# Patient Record
Sex: MALE | URBAN - METROPOLITAN AREA
[De-identification: names, ages, dates, MRNs, and addresses within clinical notes are randomized per-mention and may not be internally consistent; named-entity substitution may affect disease eponyms.]

---

## 2020-01-01 ENCOUNTER — INPATIENT (INPATIENT)
Facility: HOSPITAL | Age: 40
LOS: 0 days | End: 2020-06-18
Attending: NEUROLOGICAL SURGERY | Admitting: NEUROLOGICAL SURGERY
Payer: SELF-PAY

## 2020-01-01 VITALS
SYSTOLIC BLOOD PRESSURE: 138 MMHG | OXYGEN SATURATION: 98 % | TEMPERATURE: 97 F | DIASTOLIC BLOOD PRESSURE: 74 MMHG | RESPIRATION RATE: 20 BRPM | WEIGHT: 154.32 LBS | HEART RATE: 72 BPM

## 2020-01-01 VITALS — RESPIRATION RATE: 20 BRPM | HEART RATE: 88 BPM

## 2020-01-01 DIAGNOSIS — S06.5X9A TRAUMATIC SUBDURAL HEMORRHAGE WITH LOSS OF CONSCIOUSNESS OF UNSPECIFIED DURATION, INITIAL ENCOUNTER: ICD-10-CM

## 2020-01-01 DIAGNOSIS — Z98.890 OTHER SPECIFIED POSTPROCEDURAL STATES: ICD-10-CM

## 2020-01-01 LAB
ALBUMIN SERPL ELPH-MCNC: 3.1 G/DL — LOW (ref 3.3–5)
ALBUMIN SERPL ELPH-MCNC: 3.2 G/DL — LOW (ref 3.3–5)
ALBUMIN SERPL ELPH-MCNC: 3.3 G/DL — SIGNIFICANT CHANGE UP (ref 3.3–5)
ALP SERPL-CCNC: 57 U/L — SIGNIFICANT CHANGE UP (ref 40–120)
ALP SERPL-CCNC: 64 U/L — SIGNIFICANT CHANGE UP (ref 40–120)
ALP SERPL-CCNC: 87 U/L — SIGNIFICANT CHANGE UP (ref 40–120)
ALT FLD-CCNC: 49 U/L — HIGH (ref 4–41)
ALT FLD-CCNC: 55 U/L — HIGH (ref 4–41)
ALT FLD-CCNC: 84 U/L — HIGH (ref 4–41)
AMPHET UR-MCNC: NEGATIVE — SIGNIFICANT CHANGE UP
ANION GAP SERPL CALC-SCNC: 18 MMO/L — HIGH (ref 7–14)
ANION GAP SERPL CALC-SCNC: 19 MMO/L — HIGH (ref 7–14)
ANION GAP SERPL CALC-SCNC: 19 MMO/L — HIGH (ref 7–14)
ANION GAP SERPL CALC-SCNC: 21 MMO/L — HIGH (ref 7–14)
APAP SERPL-MCNC: < 15 UG/ML — LOW (ref 15–25)
APPEARANCE UR: SIGNIFICANT CHANGE UP
APTT BLD: 22.5 SEC — LOW (ref 27.5–36.3)
APTT BLD: 23.4 SEC — LOW (ref 27.5–36.3)
AST SERPL-CCNC: 117 U/L — HIGH (ref 4–40)
AST SERPL-CCNC: 126 U/L — HIGH (ref 4–40)
AST SERPL-CCNC: 202 U/L — HIGH (ref 4–40)
BACTERIA # UR AUTO: HIGH
BARBITURATES UR SCN-MCNC: NEGATIVE — SIGNIFICANT CHANGE UP
BASE EXCESS BLDA CALC-SCNC: -18.1 MMOL/L — SIGNIFICANT CHANGE UP
BASE EXCESS BLDA CALC-SCNC: -20.2 MMOL/L — SIGNIFICANT CHANGE UP
BASE EXCESS BLDA CALC-SCNC: 2.8 MMOL/L — SIGNIFICANT CHANGE UP
BASE EXCESS BLDA CALC-SCNC: 3 MMOL/L — SIGNIFICANT CHANGE UP
BASE EXCESS BLDA CALC-SCNC: 3.1 MMOL/L — SIGNIFICANT CHANGE UP
BASE EXCESS BLDV CALC-SCNC: 1.8 MMOL/L — SIGNIFICANT CHANGE UP
BASOPHILS # BLD AUTO: 0.01 K/UL — SIGNIFICANT CHANGE UP (ref 0–0.2)
BASOPHILS # BLD AUTO: 0.01 K/UL — SIGNIFICANT CHANGE UP (ref 0–0.2)
BASOPHILS # BLD AUTO: 0.02 K/UL — SIGNIFICANT CHANGE UP (ref 0–0.2)
BASOPHILS NFR BLD AUTO: 0.1 % — SIGNIFICANT CHANGE UP (ref 0–2)
BASOPHILS NFR BLD AUTO: 0.1 % — SIGNIFICANT CHANGE UP (ref 0–2)
BASOPHILS NFR BLD AUTO: 0.4 % — SIGNIFICANT CHANGE UP (ref 0–2)
BENZODIAZ UR-MCNC: NEGATIVE — SIGNIFICANT CHANGE UP
BILIRUB SERPL-MCNC: 0.8 MG/DL — SIGNIFICANT CHANGE UP (ref 0.2–1.2)
BILIRUB SERPL-MCNC: 1.1 MG/DL — SIGNIFICANT CHANGE UP (ref 0.2–1.2)
BILIRUB SERPL-MCNC: 2 MG/DL — HIGH (ref 0.2–1.2)
BILIRUB UR-MCNC: NEGATIVE — SIGNIFICANT CHANGE UP
BLD GP AB SCN SERPL QL: NEGATIVE — SIGNIFICANT CHANGE UP
BLOOD GAS ARTERIAL - FIO2: 21 — SIGNIFICANT CHANGE UP
BLOOD GAS ARTERIAL - FIO2: 30 — SIGNIFICANT CHANGE UP
BLOOD GAS ARTERIAL - FIO2: 50 — SIGNIFICANT CHANGE UP
BLOOD GAS ARTERIAL - FIO2: 60 — SIGNIFICANT CHANGE UP
BLOOD GAS ARTERIAL - FIO2: 80 — SIGNIFICANT CHANGE UP
BLOOD GAS VENOUS - CREATININE: 0.7 MG/DL — SIGNIFICANT CHANGE UP (ref 0.5–1.3)
BLOOD GAS VENOUS - FIO2: 21 — SIGNIFICANT CHANGE UP
BLOOD UR QL VISUAL: SIGNIFICANT CHANGE UP
BUN SERPL-MCNC: 10 MG/DL — SIGNIFICANT CHANGE UP (ref 7–23)
BUN SERPL-MCNC: 11 MG/DL — SIGNIFICANT CHANGE UP (ref 7–23)
BUN SERPL-MCNC: 11 MG/DL — SIGNIFICANT CHANGE UP (ref 7–23)
BUN SERPL-MCNC: 13 MG/DL — SIGNIFICANT CHANGE UP (ref 7–23)
CA-I BLDA-SCNC: 0.94 MMOL/L — LOW (ref 1.15–1.29)
CA-I BLDA-SCNC: 0.98 MMOL/L — LOW (ref 1.15–1.29)
CA-I BLDA-SCNC: 1.05 MMOL/L — LOW (ref 1.15–1.29)
CALCIUM SERPL-MCNC: 7.5 MG/DL — LOW (ref 8.4–10.5)
CALCIUM SERPL-MCNC: 7.9 MG/DL — LOW (ref 8.4–10.5)
CALCIUM SERPL-MCNC: 8.2 MG/DL — LOW (ref 8.4–10.5)
CALCIUM SERPL-MCNC: 8.3 MG/DL — LOW (ref 8.4–10.5)
CANNABINOIDS UR-MCNC: NEGATIVE — SIGNIFICANT CHANGE UP
CHLORIDE BLDA-SCNC: 103 MMOL/L — SIGNIFICANT CHANGE UP (ref 96–108)
CHLORIDE BLDA-SCNC: 104 MMOL/L — SIGNIFICANT CHANGE UP (ref 96–108)
CHLORIDE BLDV-SCNC: 97 MMOL/L — SIGNIFICANT CHANGE UP (ref 96–108)
CHLORIDE SERPL-SCNC: 100 MMOL/L — SIGNIFICANT CHANGE UP (ref 98–107)
CHLORIDE SERPL-SCNC: 101 MMOL/L — SIGNIFICANT CHANGE UP (ref 98–107)
CHLORIDE SERPL-SCNC: 103 MMOL/L — SIGNIFICANT CHANGE UP (ref 98–107)
CHLORIDE SERPL-SCNC: 92 MMOL/L — LOW (ref 98–107)
CHLORIDE UR-SCNC: 82 MMOL/L — SIGNIFICANT CHANGE UP
CO2 SERPL-SCNC: 23 MMOL/L — SIGNIFICANT CHANGE UP (ref 22–31)
CO2 SERPL-SCNC: 23 MMOL/L — SIGNIFICANT CHANGE UP (ref 22–31)
CO2 SERPL-SCNC: 24 MMOL/L — SIGNIFICANT CHANGE UP (ref 22–31)
CO2 SERPL-SCNC: 25 MMOL/L — SIGNIFICANT CHANGE UP (ref 22–31)
COCAINE METAB.OTHER UR-MCNC: NEGATIVE — SIGNIFICANT CHANGE UP
COLOR SPEC: YELLOW — SIGNIFICANT CHANGE UP
CREAT ?TM UR-MCNC: 63.2 MG/DL — SIGNIFICANT CHANGE UP
CREAT SERPL-MCNC: 0.72 MG/DL — SIGNIFICANT CHANGE UP (ref 0.5–1.3)
CREAT SERPL-MCNC: 0.73 MG/DL — SIGNIFICANT CHANGE UP (ref 0.5–1.3)
CREAT SERPL-MCNC: 0.74 MG/DL — SIGNIFICANT CHANGE UP (ref 0.5–1.3)
CREAT SERPL-MCNC: 0.76 MG/DL — SIGNIFICANT CHANGE UP (ref 0.5–1.3)
EOSINOPHIL # BLD AUTO: 0 K/UL — SIGNIFICANT CHANGE UP (ref 0–0.5)
EOSINOPHIL NFR BLD AUTO: 0 % — SIGNIFICANT CHANGE UP (ref 0–6)
EPI CELLS # UR: SIGNIFICANT CHANGE UP
ETHANOL BLD-MCNC: 341 MG/DL — HIGH
GAS PNL BLDV: 136 MMOL/L — SIGNIFICANT CHANGE UP (ref 136–146)
GLUCOSE BLDA-MCNC: 141 MG/DL — HIGH (ref 70–99)
GLUCOSE BLDA-MCNC: 152 MG/DL — HIGH (ref 70–99)
GLUCOSE BLDA-MCNC: 167 MG/DL — HIGH (ref 70–99)
GLUCOSE BLDA-MCNC: 35 MG/DL — CRITICAL LOW (ref 70–99)
GLUCOSE BLDA-MCNC: 43 MG/DL — CRITICAL LOW (ref 70–99)
GLUCOSE BLDV-MCNC: 147 MG/DL — HIGH (ref 70–99)
GLUCOSE SERPL-MCNC: 148 MG/DL — HIGH (ref 70–99)
GLUCOSE SERPL-MCNC: 148 MG/DL — HIGH (ref 70–99)
GLUCOSE SERPL-MCNC: 150 MG/DL — HIGH (ref 70–99)
GLUCOSE SERPL-MCNC: 169 MG/DL — HIGH (ref 70–99)
GLUCOSE UR-MCNC: 50 — SIGNIFICANT CHANGE UP
HCO3 BLDA-SCNC: 10 MMOL/L — CRITICAL LOW (ref 22–26)
HCO3 BLDA-SCNC: 27 MMOL/L — HIGH (ref 22–26)
HCO3 BLDA-SCNC: 27 MMOL/L — HIGH (ref 22–26)
HCO3 BLDA-SCNC: 28 MMOL/L — HIGH (ref 22–26)
HCO3 BLDA-SCNC: 9 MMOL/L — CRITICAL LOW (ref 22–26)
HCO3 BLDV-SCNC: 26 MMOL/L — SIGNIFICANT CHANGE UP (ref 20–27)
HCT VFR BLD CALC: 26.8 % — LOW (ref 39–50)
HCT VFR BLD CALC: 28.8 % — LOW (ref 39–50)
HCT VFR BLD CALC: 29.8 % — LOW (ref 39–50)
HCT VFR BLD CALC: 31.5 % — LOW (ref 39–50)
HCT VFR BLDA CALC: 25.4 % — LOW (ref 39–51)
HCT VFR BLDA CALC: 29.3 % — LOW (ref 39–51)
HCT VFR BLDA CALC: 31.2 % — LOW (ref 39–51)
HCT VFR BLDA CALC: 32 % — LOW (ref 39–51)
HCT VFR BLDA CALC: 33 % — LOW (ref 39–51)
HCT VFR BLDV CALC: 34 % — LOW (ref 39–51)
HGB BLD-MCNC: 10.1 G/DL — LOW (ref 13–17)
HGB BLD-MCNC: 10.6 G/DL — LOW (ref 13–17)
HGB BLD-MCNC: 9.3 G/DL — LOW (ref 13–17)
HGB BLD-MCNC: 9.9 G/DL — LOW (ref 13–17)
HGB BLDA-MCNC: 10.1 G/DL — LOW (ref 13–17)
HGB BLDA-MCNC: 10.4 G/DL — LOW (ref 13–17)
HGB BLDA-MCNC: 10.7 G/DL — LOW (ref 13–17)
HGB BLDA-MCNC: 8.2 G/DL — LOW (ref 13–17)
HGB BLDA-MCNC: 9.5 G/DL — LOW (ref 13–17)
HGB BLDV-MCNC: 11 G/DL — LOW (ref 13–17)
IMM GRANULOCYTES NFR BLD AUTO: 0.2 % — SIGNIFICANT CHANGE UP (ref 0–1.5)
IMM GRANULOCYTES NFR BLD AUTO: 0.6 % — SIGNIFICANT CHANGE UP (ref 0–1.5)
IMM GRANULOCYTES NFR BLD AUTO: 0.6 % — SIGNIFICANT CHANGE UP (ref 0–1.5)
INR BLD: 0.92 — SIGNIFICANT CHANGE UP (ref 0.88–1.17)
INR BLD: 0.97 — SIGNIFICANT CHANGE UP (ref 0.88–1.17)
KETONES UR-MCNC: 20 — SIGNIFICANT CHANGE UP
LACTATE BLDA-SCNC: 10.5 MMOL/L — CRITICAL HIGH (ref 0.5–2)
LACTATE BLDA-SCNC: 10.7 MMOL/L — CRITICAL HIGH (ref 0.5–2)
LACTATE BLDA-SCNC: 2.4 MMOL/L — HIGH (ref 0.5–2)
LACTATE BLDA-SCNC: 2.6 MMOL/L — HIGH (ref 0.5–2)
LACTATE BLDA-SCNC: 3.8 MMOL/L — HIGH (ref 0.5–2)
LACTATE BLDV-MCNC: 4.2 MMOL/L — CRITICAL HIGH (ref 0.5–2)
LEUKOCYTE ESTERASE UR-ACNC: NEGATIVE — SIGNIFICANT CHANGE UP
LYMPHOCYTES # BLD AUTO: 0.15 K/UL — LOW (ref 1–3.3)
LYMPHOCYTES # BLD AUTO: 0.28 K/UL — LOW (ref 1–3.3)
LYMPHOCYTES # BLD AUTO: 0.3 K/UL — LOW (ref 1–3.3)
LYMPHOCYTES # BLD AUTO: 1.7 % — LOW (ref 13–44)
LYMPHOCYTES # BLD AUTO: 3.9 % — LOW (ref 13–44)
LYMPHOCYTES # BLD AUTO: 5.6 % — LOW (ref 13–44)
MAGNESIUM SERPL-MCNC: 2.2 MG/DL — SIGNIFICANT CHANGE UP (ref 1.6–2.6)
MCHC RBC-ENTMCNC: 27.6 PG — SIGNIFICANT CHANGE UP (ref 27–34)
MCHC RBC-ENTMCNC: 27.7 PG — SIGNIFICANT CHANGE UP (ref 27–34)
MCHC RBC-ENTMCNC: 28 PG — SIGNIFICANT CHANGE UP (ref 27–34)
MCHC RBC-ENTMCNC: 28.9 PG — SIGNIFICANT CHANGE UP (ref 27–34)
MCHC RBC-ENTMCNC: 33.7 % — SIGNIFICANT CHANGE UP (ref 32–36)
MCHC RBC-ENTMCNC: 33.9 % — SIGNIFICANT CHANGE UP (ref 32–36)
MCHC RBC-ENTMCNC: 34.4 % — SIGNIFICANT CHANGE UP (ref 32–36)
MCHC RBC-ENTMCNC: 34.7 % — SIGNIFICANT CHANGE UP (ref 32–36)
MCV RBC AUTO: 81.6 FL — SIGNIFICANT CHANGE UP (ref 80–100)
MCV RBC AUTO: 81.6 FL — SIGNIFICANT CHANGE UP (ref 80–100)
MCV RBC AUTO: 82 FL — SIGNIFICANT CHANGE UP (ref 80–100)
MCV RBC AUTO: 83.2 FL — SIGNIFICANT CHANGE UP (ref 80–100)
METHADONE UR-MCNC: NEGATIVE — SIGNIFICANT CHANGE UP
MONOCYTES # BLD AUTO: 0.17 K/UL — SIGNIFICANT CHANGE UP (ref 0–0.9)
MONOCYTES # BLD AUTO: 0.32 K/UL — SIGNIFICANT CHANGE UP (ref 0–0.9)
MONOCYTES # BLD AUTO: 0.42 K/UL — SIGNIFICANT CHANGE UP (ref 0–0.9)
MONOCYTES NFR BLD AUTO: 1.9 % — LOW (ref 2–14)
MONOCYTES NFR BLD AUTO: 4.5 % — SIGNIFICANT CHANGE UP (ref 2–14)
MONOCYTES NFR BLD AUTO: 7.8 % — SIGNIFICANT CHANGE UP (ref 2–14)
NEUTROPHILS # BLD AUTO: 4.61 K/UL — SIGNIFICANT CHANGE UP (ref 1.8–7.4)
NEUTROPHILS # BLD AUTO: 6.54 K/UL — SIGNIFICANT CHANGE UP (ref 1.8–7.4)
NEUTROPHILS # BLD AUTO: 8.55 K/UL — HIGH (ref 1.8–7.4)
NEUTROPHILS NFR BLD AUTO: 85.6 % — HIGH (ref 43–77)
NEUTROPHILS NFR BLD AUTO: 90.9 % — HIGH (ref 43–77)
NEUTROPHILS NFR BLD AUTO: 96.1 % — HIGH (ref 43–77)
NITRITE UR-MCNC: NEGATIVE — SIGNIFICANT CHANGE UP
NRBC # FLD: 0 K/UL — SIGNIFICANT CHANGE UP (ref 0–0)
OPIATES UR-MCNC: NEGATIVE — SIGNIFICANT CHANGE UP
OSMOLALITY SERPL: 318 MOSMO/KG — HIGH (ref 275–295)
OSMOLALITY UR: 558 MOSMO/KG — SIGNIFICANT CHANGE UP (ref 50–1200)
OXYCODONE UR-MCNC: NEGATIVE — SIGNIFICANT CHANGE UP
PCO2 BLDA: 35 MMHG — SIGNIFICANT CHANGE UP (ref 35–48)
PCO2 BLDA: 35 MMHG — SIGNIFICANT CHANGE UP (ref 35–48)
PCO2 BLDA: 38 MMHG — SIGNIFICANT CHANGE UP (ref 35–48)
PCO2 BLDA: 39 MMHG — SIGNIFICANT CHANGE UP (ref 35–48)
PCO2 BLDA: 40 MMHG — SIGNIFICANT CHANGE UP (ref 35–48)
PCO2 BLDV: 44 MMHG — SIGNIFICANT CHANGE UP (ref 41–51)
PCP UR-MCNC: NEGATIVE — SIGNIFICANT CHANGE UP
PH BLDA: 6.99 PH — CRITICAL LOW (ref 7.35–7.45)
PH BLDA: 7.05 PH — CRITICAL LOW (ref 7.35–7.45)
PH BLDA: 7.45 PH — SIGNIFICANT CHANGE UP (ref 7.35–7.45)
PH BLDA: 7.49 PH — HIGH (ref 7.35–7.45)
PH BLDA: 7.49 PH — HIGH (ref 7.35–7.45)
PH BLDV: 7.4 PH — SIGNIFICANT CHANGE UP (ref 7.32–7.43)
PH UR: 6 — SIGNIFICANT CHANGE UP (ref 5–8)
PHOSPHATE SERPL-MCNC: 3.4 MG/DL — SIGNIFICANT CHANGE UP (ref 2.5–4.5)
PLATELET # BLD AUTO: 126 K/UL — LOW (ref 150–400)
PLATELET # BLD AUTO: 126 K/UL — LOW (ref 150–400)
PLATELET # BLD AUTO: 132 K/UL — LOW (ref 150–400)
PLATELET # BLD AUTO: 134 K/UL — LOW (ref 150–400)
PMV BLD: 9.1 FL — SIGNIFICANT CHANGE UP (ref 7–13)
PMV BLD: 9.3 FL — SIGNIFICANT CHANGE UP (ref 7–13)
PMV BLD: 9.4 FL — SIGNIFICANT CHANGE UP (ref 7–13)
PMV BLD: 9.6 FL — SIGNIFICANT CHANGE UP (ref 7–13)
PO2 BLDA: 112 MMHG — HIGH (ref 83–108)
PO2 BLDA: 136 MMHG — HIGH (ref 83–108)
PO2 BLDA: 140 MMHG — HIGH (ref 83–108)
PO2 BLDA: 237 MMHG — HIGH (ref 83–108)
PO2 BLDA: 90 MMHG — SIGNIFICANT CHANGE UP (ref 83–108)
PO2 BLDV: 100 MMHG — HIGH (ref 35–40)
POTASSIUM BLDA-SCNC: 3.5 MMOL/L — SIGNIFICANT CHANGE UP (ref 3.4–4.5)
POTASSIUM BLDA-SCNC: 3.5 MMOL/L — SIGNIFICANT CHANGE UP (ref 3.4–4.5)
POTASSIUM BLDA-SCNC: 3.7 MMOL/L — SIGNIFICANT CHANGE UP (ref 3.4–4.5)
POTASSIUM BLDA-SCNC: 3.9 MMOL/L — SIGNIFICANT CHANGE UP (ref 3.4–4.5)
POTASSIUM BLDA-SCNC: 4 MMOL/L — SIGNIFICANT CHANGE UP (ref 3.4–4.5)
POTASSIUM BLDV-SCNC: 2.7 MMOL/L — CRITICAL LOW (ref 3.4–4.5)
POTASSIUM SERPL-MCNC: 3.3 MMOL/L — LOW (ref 3.5–5.3)
POTASSIUM SERPL-MCNC: 3.9 MMOL/L — SIGNIFICANT CHANGE UP (ref 3.5–5.3)
POTASSIUM SERPL-MCNC: 4.1 MMOL/L — SIGNIFICANT CHANGE UP (ref 3.5–5.3)
POTASSIUM SERPL-MCNC: 4.2 MMOL/L — SIGNIFICANT CHANGE UP (ref 3.5–5.3)
POTASSIUM SERPL-SCNC: 3.3 MMOL/L — LOW (ref 3.5–5.3)
POTASSIUM SERPL-SCNC: 3.9 MMOL/L — SIGNIFICANT CHANGE UP (ref 3.5–5.3)
POTASSIUM SERPL-SCNC: 4.1 MMOL/L — SIGNIFICANT CHANGE UP (ref 3.5–5.3)
POTASSIUM SERPL-SCNC: 4.2 MMOL/L — SIGNIFICANT CHANGE UP (ref 3.5–5.3)
POTASSIUM UR-SCNC: 73.5 MMOL/L — SIGNIFICANT CHANGE UP
PROT SERPL-MCNC: 4.4 G/DL — LOW (ref 6–8.3)
PROT SERPL-MCNC: 4.9 G/DL — LOW (ref 6–8.3)
PROT SERPL-MCNC: 5.5 G/DL — LOW (ref 6–8.3)
PROT UR-MCNC: 100 — HIGH
PROTHROM AB SERPL-ACNC: 10.5 SEC — SIGNIFICANT CHANGE UP (ref 9.8–13.1)
PROTHROM AB SERPL-ACNC: 11.1 SEC — SIGNIFICANT CHANGE UP (ref 9.8–13.1)
RBC # BLD: 3.22 M/UL — LOW (ref 4.2–5.8)
RBC # BLD: 3.53 M/UL — LOW (ref 4.2–5.8)
RBC # BLD: 3.65 M/UL — LOW (ref 4.2–5.8)
RBC # BLD: 3.84 M/UL — LOW (ref 4.2–5.8)
RBC # FLD: 16.9 % — HIGH (ref 10.3–14.5)
RBC # FLD: 17 % — HIGH (ref 10.3–14.5)
RBC # FLD: 17.2 % — HIGH (ref 10.3–14.5)
RBC # FLD: 18.9 % — HIGH (ref 10.3–14.5)
RBC CASTS # UR COMP ASSIST: >50 — HIGH (ref 0–?)
RH IG SCN BLD-IMP: POSITIVE — SIGNIFICANT CHANGE UP
RH IG SCN BLD-IMP: POSITIVE — SIGNIFICANT CHANGE UP
SALICYLATES SERPL-MCNC: < 5 MG/DL — LOW (ref 15–30)
SAO2 % BLDA: 91.1 % — LOW (ref 95–99)
SAO2 % BLDA: 93.7 % — LOW (ref 95–99)
SAO2 % BLDA: 99.3 % — HIGH (ref 95–99)
SAO2 % BLDA: 99.4 % — HIGH (ref 95–99)
SAO2 % BLDA: 99.4 % — HIGH (ref 95–99)
SAO2 % BLDV: 96.6 % — HIGH (ref 60–85)
SARS-COV-2 RNA SPEC QL NAA+PROBE: SIGNIFICANT CHANGE UP
SODIUM BLDA-SCNC: 140 MMOL/L — SIGNIFICANT CHANGE UP (ref 136–146)
SODIUM BLDA-SCNC: 143 MMOL/L — SIGNIFICANT CHANGE UP (ref 136–146)
SODIUM BLDA-SCNC: 143 MMOL/L — SIGNIFICANT CHANGE UP (ref 136–146)
SODIUM BLDA-SCNC: 151 MMOL/L — HIGH (ref 136–146)
SODIUM BLDA-SCNC: 151 MMOL/L — HIGH (ref 136–146)
SODIUM SERPL-SCNC: 136 MMOL/L — SIGNIFICANT CHANGE UP (ref 135–145)
SODIUM SERPL-SCNC: 142 MMOL/L — SIGNIFICANT CHANGE UP (ref 135–145)
SODIUM SERPL-SCNC: 145 MMOL/L — SIGNIFICANT CHANGE UP (ref 135–145)
SODIUM SERPL-SCNC: 145 MMOL/L — SIGNIFICANT CHANGE UP (ref 135–145)
SODIUM UR-SCNC: 65 MMOL/L — SIGNIFICANT CHANGE UP
SP GR SPEC: > 1.04 — HIGH (ref 1–1.04)
UROBILINOGEN FLD QL: NORMAL — SIGNIFICANT CHANGE UP
WBC # BLD: 5.38 K/UL — SIGNIFICANT CHANGE UP (ref 3.8–10.5)
WBC # BLD: 7.19 K/UL — SIGNIFICANT CHANGE UP (ref 3.8–10.5)
WBC # BLD: 8.9 K/UL — SIGNIFICANT CHANGE UP (ref 3.8–10.5)
WBC # BLD: 9.07 K/UL — SIGNIFICANT CHANGE UP (ref 3.8–10.5)
WBC # FLD AUTO: 5.38 K/UL — SIGNIFICANT CHANGE UP (ref 3.8–10.5)
WBC # FLD AUTO: 7.19 K/UL — SIGNIFICANT CHANGE UP (ref 3.8–10.5)
WBC # FLD AUTO: 8.9 K/UL — SIGNIFICANT CHANGE UP (ref 3.8–10.5)
WBC # FLD AUTO: 9.07 K/UL — SIGNIFICANT CHANGE UP (ref 3.8–10.5)
WBC UR QL: SIGNIFICANT CHANGE UP (ref 0–?)

## 2020-01-01 PROCEDURE — 31500 INSERT EMERGENCY AIRWAY: CPT

## 2020-01-01 PROCEDURE — 74018 RADEX ABDOMEN 1 VIEW: CPT | Mod: 26

## 2020-01-01 PROCEDURE — 99284 EMERGENCY DEPT VISIT MOD MDM: CPT

## 2020-01-01 PROCEDURE — 93010 ELECTROCARDIOGRAM REPORT: CPT | Mod: 76

## 2020-01-01 PROCEDURE — 71045 X-RAY EXAM CHEST 1 VIEW: CPT | Mod: 26,76

## 2020-01-01 PROCEDURE — 99223 1ST HOSP IP/OBS HIGH 75: CPT

## 2020-01-01 PROCEDURE — 99291 CRITICAL CARE FIRST HOUR: CPT

## 2020-01-01 PROCEDURE — 70450 CT HEAD/BRAIN W/O DYE: CPT | Mod: 26

## 2020-01-01 PROCEDURE — 61312 CRNEC/CRNOT STTL XDRL/SDRL: CPT

## 2020-01-01 PROCEDURE — 71045 X-RAY EXAM CHEST 1 VIEW: CPT | Mod: 26

## 2020-01-01 PROCEDURE — 72125 CT NECK SPINE W/O DYE: CPT | Mod: 26

## 2020-01-01 PROCEDURE — 74177 CT ABD & PELVIS W/CONTRAST: CPT | Mod: 26

## 2020-01-01 RX ORDER — PHENYLEPHRINE HYDROCHLORIDE 10 MG/ML
0.4 INJECTION INTRAVENOUS
Qty: 40 | Refills: 0 | Status: DISCONTINUED | OUTPATIENT
Start: 2020-01-01 | End: 2020-01-01

## 2020-01-01 RX ORDER — SUCCINYLCHOLINE CHLORIDE 100 MG/5ML
20 SYRINGE (ML) INTRAVENOUS ONCE
Refills: 0 | Status: DISCONTINUED | OUTPATIENT
Start: 2020-01-01 | End: 2020-01-01

## 2020-01-01 RX ORDER — VASOPRESSIN 20 [USP'U]/ML
0.1 INJECTION INTRAVENOUS
Qty: 50 | Refills: 0 | Status: DISCONTINUED | OUTPATIENT
Start: 2020-01-01 | End: 2020-01-01

## 2020-01-01 RX ORDER — CHLORHEXIDINE GLUCONATE 213 G/1000ML
1 SOLUTION TOPICAL
Refills: 0 | Status: DISCONTINUED | OUTPATIENT
Start: 2020-01-01 | End: 2020-01-01

## 2020-01-01 RX ORDER — SODIUM CHLORIDE 5 G/100ML
500 INJECTION, SOLUTION INTRAVENOUS
Refills: 0 | Status: DISCONTINUED | OUTPATIENT
Start: 2020-01-01 | End: 2020-01-01

## 2020-01-01 RX ORDER — NICARDIPINE HYDROCHLORIDE 30 MG/1
5 CAPSULE, EXTENDED RELEASE ORAL
Qty: 40 | Refills: 0 | Status: DISCONTINUED | OUTPATIENT
Start: 2020-01-01 | End: 2020-01-01

## 2020-01-01 RX ORDER — ADENOSINE 3 MG/ML
12 INJECTION INTRAVENOUS ONCE
Refills: 0 | Status: COMPLETED | OUTPATIENT
Start: 2020-01-01 | End: 2020-01-01

## 2020-01-01 RX ORDER — DEXMEDETOMIDINE HYDROCHLORIDE IN 0.9% SODIUM CHLORIDE 4 UG/ML
0.2 INJECTION INTRAVENOUS
Qty: 400 | Refills: 0 | Status: DISCONTINUED | OUTPATIENT
Start: 2020-01-01 | End: 2020-01-01

## 2020-01-01 RX ORDER — HYDROMORPHONE HYDROCHLORIDE 2 MG/ML
0.5 INJECTION INTRAMUSCULAR; INTRAVENOUS; SUBCUTANEOUS ONCE
Refills: 0 | Status: DISCONTINUED | OUTPATIENT
Start: 2020-01-01 | End: 2020-01-01

## 2020-01-01 RX ORDER — VASOPRESSIN 20 [USP'U]/ML
0.03 INJECTION INTRAVENOUS
Qty: 50 | Refills: 0 | Status: DISCONTINUED | OUTPATIENT
Start: 2020-01-01 | End: 2020-01-01

## 2020-01-01 RX ORDER — PHENYLEPHRINE HYDROCHLORIDE 10 MG/ML
0.3 INJECTION INTRAVENOUS
Qty: 160 | Refills: 0 | Status: DISCONTINUED | OUTPATIENT
Start: 2020-01-01 | End: 2020-01-01

## 2020-01-01 RX ORDER — LEVETIRACETAM 250 MG/1
500 TABLET, FILM COATED ORAL EVERY 12 HOURS
Refills: 0 | Status: DISCONTINUED | OUTPATIENT
Start: 2020-01-01 | End: 2020-01-01

## 2020-01-01 RX ORDER — DOPAMINE HYDROCHLORIDE 40 MG/ML
5 INJECTION, SOLUTION, CONCENTRATE INTRAVENOUS
Qty: 400 | Refills: 0 | Status: DISCONTINUED | OUTPATIENT
Start: 2020-01-01 | End: 2020-01-01

## 2020-01-01 RX ORDER — CALCIUM GLUCONATE 100 MG/ML
2 VIAL (ML) INTRAVENOUS ONCE
Refills: 0 | Status: DISCONTINUED | OUTPATIENT
Start: 2020-01-01 | End: 2020-01-01

## 2020-01-01 RX ORDER — ACETAMINOPHEN 500 MG
1000 TABLET ORAL ONCE
Refills: 0 | Status: COMPLETED | OUTPATIENT
Start: 2020-01-01 | End: 2020-01-01

## 2020-01-01 RX ORDER — SODIUM CHLORIDE 9 MG/ML
1000 INJECTION INTRAMUSCULAR; INTRAVENOUS; SUBCUTANEOUS ONCE
Refills: 0 | Status: COMPLETED | OUTPATIENT
Start: 2020-01-01 | End: 2020-01-01

## 2020-01-01 RX ORDER — DILTIAZEM HCL 120 MG
10 CAPSULE, EXT RELEASE 24 HR ORAL ONCE
Refills: 0 | Status: COMPLETED | OUTPATIENT
Start: 2020-01-01 | End: 2020-01-01

## 2020-01-01 RX ORDER — PROPOFOL 10 MG/ML
10 INJECTION, EMULSION INTRAVENOUS
Qty: 1000 | Refills: 0 | Status: DISCONTINUED | OUTPATIENT
Start: 2020-01-01 | End: 2020-01-01

## 2020-01-01 RX ORDER — DEXTROSE 50 % IN WATER 50 %
50 SYRINGE (ML) INTRAVENOUS
Refills: 0 | Status: COMPLETED | OUTPATIENT
Start: 2020-01-01 | End: 2020-01-01

## 2020-01-01 RX ORDER — DILTIAZEM HCL 120 MG
5 CAPSULE, EXT RELEASE 24 HR ORAL
Qty: 125 | Refills: 0 | Status: DISCONTINUED | OUTPATIENT
Start: 2020-01-01 | End: 2020-01-01

## 2020-01-01 RX ORDER — ADENOSINE 3 MG/ML
6 INJECTION INTRAVENOUS ONCE
Refills: 0 | Status: COMPLETED | OUTPATIENT
Start: 2020-01-01 | End: 2020-01-01

## 2020-01-01 RX ORDER — SODIUM CHLORIDE 9 MG/ML
1000 INJECTION, SOLUTION INTRAVENOUS ONCE
Refills: 0 | Status: COMPLETED | OUTPATIENT
Start: 2020-01-01 | End: 2020-01-01

## 2020-01-01 RX ORDER — NOREPINEPHRINE BITARTRATE/D5W 8 MG/250ML
0.05 PLASTIC BAG, INJECTION (ML) INTRAVENOUS
Qty: 16 | Refills: 0 | Status: DISCONTINUED | OUTPATIENT
Start: 2020-01-01 | End: 2020-01-01

## 2020-01-01 RX ORDER — SODIUM CHLORIDE 9 MG/ML
500 INJECTION INTRAMUSCULAR; INTRAVENOUS; SUBCUTANEOUS ONCE
Refills: 0 | Status: COMPLETED | OUTPATIENT
Start: 2020-01-01 | End: 2020-01-01

## 2020-01-01 RX ORDER — MANNITOL
70 POWDER (GRAM) MISCELLANEOUS ONCE
Refills: 0 | Status: COMPLETED | OUTPATIENT
Start: 2020-01-01 | End: 2020-01-01

## 2020-01-01 RX ORDER — ROCURONIUM BROMIDE 10 MG/ML
70 VIAL (ML) INTRAVENOUS ONCE
Refills: 0 | Status: COMPLETED | OUTPATIENT
Start: 2020-01-01 | End: 2020-01-01

## 2020-01-01 RX ORDER — PHENYLEPHRINE HYDROCHLORIDE 10 MG/ML
10 INJECTION INTRAVENOUS
Qty: 40 | Refills: 0 | Status: DISCONTINUED | OUTPATIENT
Start: 2020-01-01 | End: 2020-01-01

## 2020-01-01 RX ORDER — SODIUM BICARBONATE 1 MEQ/ML
0.32 SYRINGE (ML) INTRAVENOUS
Qty: 150 | Refills: 0 | Status: DISCONTINUED | OUTPATIENT
Start: 2020-01-01 | End: 2020-01-01

## 2020-01-01 RX ORDER — DILTIAZEM HCL 120 MG
10 CAPSULE, EXT RELEASE 24 HR ORAL ONCE
Refills: 0 | Status: DISCONTINUED | OUTPATIENT
Start: 2020-01-01 | End: 2020-01-01

## 2020-01-01 RX ORDER — CHLORHEXIDINE GLUCONATE 213 G/1000ML
15 SOLUTION TOPICAL EVERY 12 HOURS
Refills: 0 | Status: DISCONTINUED | OUTPATIENT
Start: 2020-01-01 | End: 2020-01-01

## 2020-01-01 RX ORDER — SODIUM BICARBONATE 1 MEQ/ML
50 SYRINGE (ML) INTRAVENOUS
Refills: 0 | Status: COMPLETED | OUTPATIENT
Start: 2020-01-01 | End: 2020-01-01

## 2020-01-01 RX ORDER — PHENYLEPHRINE HYDROCHLORIDE 10 MG/ML
10 INJECTION INTRAVENOUS
Qty: 160 | Refills: 0 | Status: DISCONTINUED | OUTPATIENT
Start: 2020-01-01 | End: 2020-01-01

## 2020-01-01 RX ORDER — EPINEPHRINE 0.3 MG/.3ML
0.01 INJECTION INTRAMUSCULAR; SUBCUTANEOUS
Qty: 4 | Refills: 0 | Status: DISCONTINUED | OUTPATIENT
Start: 2020-01-01 | End: 2020-01-01

## 2020-01-01 RX ORDER — ETOMIDATE 2 MG/ML
20 INJECTION INTRAVENOUS ONCE
Refills: 0 | Status: COMPLETED | OUTPATIENT
Start: 2020-01-01 | End: 2020-01-01

## 2020-01-01 RX ORDER — PANTOPRAZOLE SODIUM 20 MG/1
40 TABLET, DELAYED RELEASE ORAL EVERY 12 HOURS
Refills: 0 | Status: DISCONTINUED | OUTPATIENT
Start: 2020-01-01 | End: 2020-01-01

## 2020-01-01 RX ORDER — PHENYLEPHRINE HYDROCHLORIDE 10 MG/ML
0.4 INJECTION INTRAVENOUS
Qty: 160 | Refills: 0 | Status: DISCONTINUED | OUTPATIENT
Start: 2020-01-01 | End: 2020-01-01

## 2020-01-01 RX ORDER — CEFAZOLIN SODIUM 1 G
2000 VIAL (EA) INJECTION EVERY 8 HOURS
Refills: 0 | Status: DISCONTINUED | OUTPATIENT
Start: 2020-01-01 | End: 2020-01-01

## 2020-01-01 RX ADMIN — CHLORHEXIDINE GLUCONATE 15 MILLILITER(S): 213 SOLUTION TOPICAL at 06:23

## 2020-01-01 RX ADMIN — SODIUM CHLORIDE 100 MILLILITER(S): 5 INJECTION, SOLUTION INTRAVENOUS at 08:00

## 2020-01-01 RX ADMIN — SODIUM CHLORIDE 500 MILLILITER(S): 9 INJECTION INTRAMUSCULAR; INTRAVENOUS; SUBCUTANEOUS at 02:10

## 2020-01-01 RX ADMIN — PHENYLEPHRINE HYDROCHLORIDE 3.94 MICROGRAM(S)/KG/MIN: 10 INJECTION INTRAVENOUS at 08:00

## 2020-01-01 RX ADMIN — SODIUM CHLORIDE 1000 MILLILITER(S): 9 INJECTION INTRAMUSCULAR; INTRAVENOUS; SUBCUTANEOUS at 18:23

## 2020-01-01 RX ADMIN — Medication 50 MILLIEQUIVALENT(S): at 19:08

## 2020-01-01 RX ADMIN — Medication 3.28 MICROGRAM(S)/KG/MIN: at 10:30

## 2020-01-01 RX ADMIN — PHENYLEPHRINE HYDROCHLORIDE 10.5 MICROGRAM(S)/KG/MIN: 10 INJECTION INTRAVENOUS at 06:24

## 2020-01-01 RX ADMIN — SODIUM CHLORIDE 2000 MILLILITER(S): 9 INJECTION, SOLUTION INTRAVENOUS at 06:40

## 2020-01-01 RX ADMIN — Medication 10 MILLIGRAM(S): at 11:52

## 2020-01-01 RX ADMIN — ETOMIDATE 20 MILLIGRAM(S): 2 INJECTION INTRAVENOUS at 18:39

## 2020-01-01 RX ADMIN — VASOPRESSIN 6 UNIT(S)/MIN: 20 INJECTION INTRAVENOUS at 07:04

## 2020-01-01 RX ADMIN — Medication 10 MILLIGRAM(S): at 11:20

## 2020-01-01 RX ADMIN — Medication 5 MG/HR: at 12:13

## 2020-01-01 RX ADMIN — Medication 1050 GRAM(S): at 18:50

## 2020-01-01 RX ADMIN — NICARDIPINE HYDROCHLORIDE 25 MG/HR: 30 CAPSULE, EXTENDED RELEASE ORAL at 18:50

## 2020-01-01 RX ADMIN — Medication 70 MILLIGRAM(S): at 18:40

## 2020-01-01 RX ADMIN — Medication 400 MILLIGRAM(S): at 12:15

## 2020-01-01 RX ADMIN — PROPOFOL 4.2 MICROGRAM(S)/KG/MIN: 10 INJECTION, EMULSION INTRAVENOUS at 19:10

## 2020-01-01 RX ADMIN — Medication 100 MILLIGRAM(S): at 23:25

## 2020-01-01 RX ADMIN — HYDROMORPHONE HYDROCHLORIDE 0.5 MILLIGRAM(S): 2 INJECTION INTRAMUSCULAR; INTRAVENOUS; SUBCUTANEOUS at 04:12

## 2020-01-01 RX ADMIN — CHLORHEXIDINE GLUCONATE 15 MILLILITER(S): 213 SOLUTION TOPICAL at 18:38

## 2020-01-01 RX ADMIN — PANTOPRAZOLE SODIUM 40 MILLIGRAM(S): 20 TABLET, DELAYED RELEASE ORAL at 18:38

## 2020-01-01 RX ADMIN — CHLORHEXIDINE GLUCONATE 15 MILLILITER(S): 213 SOLUTION TOPICAL at 22:06

## 2020-01-01 RX ADMIN — Medication 1 MILLIGRAM(S): at 09:28

## 2020-01-01 RX ADMIN — PHENYLEPHRINE HYDROCHLORIDE 10.5 MICROGRAM(S)/KG/MIN: 10 INJECTION INTRAVENOUS at 23:39

## 2020-01-01 RX ADMIN — Medication 100 MILLIGRAM(S): at 13:46

## 2020-01-01 RX ADMIN — ADENOSINE 12 MILLIGRAM(S): 3 INJECTION INTRAVENOUS at 10:52

## 2020-01-01 RX ADMIN — Medication 50 MILLILITER(S): at 19:08

## 2020-01-01 RX ADMIN — Medication 50 MILLIEQUIVALENT(S): at 19:05

## 2020-01-01 RX ADMIN — DEXMEDETOMIDINE HYDROCHLORIDE IN 0.9% SODIUM CHLORIDE 3.5 MICROGRAM(S)/KG/HR: 4 INJECTION INTRAVENOUS at 10:00

## 2020-01-01 RX ADMIN — PANTOPRAZOLE SODIUM 40 MILLIGRAM(S): 20 TABLET, DELAYED RELEASE ORAL at 06:24

## 2020-01-01 RX ADMIN — EPINEPHRINE 2.63 MICROGRAM(S)/KG/MIN: 0.3 INJECTION INTRAMUSCULAR; SUBCUTANEOUS at 16:16

## 2020-01-01 RX ADMIN — ADENOSINE 6 MILLIGRAM(S): 3 INJECTION INTRAVENOUS at 10:40

## 2020-01-01 RX ADMIN — NICARDIPINE HYDROCHLORIDE 25 MG/HR: 30 CAPSULE, EXTENDED RELEASE ORAL at 22:42

## 2020-01-01 RX ADMIN — Medication 2 MILLIGRAM(S): at 06:56

## 2020-01-01 RX ADMIN — Medication 400 MILLIGRAM(S): at 04:12

## 2020-01-01 RX ADMIN — VASOPRESSIN 1.8 UNIT(S)/MIN: 20 INJECTION INTRAVENOUS at 14:40

## 2020-01-01 RX ADMIN — SODIUM CHLORIDE 1000 MILLILITER(S): 9 INJECTION INTRAMUSCULAR; INTRAVENOUS; SUBCUTANEOUS at 16:15

## 2020-01-01 RX ADMIN — SODIUM CHLORIDE 1000 MILLILITER(S): 9 INJECTION, SOLUTION INTRAVENOUS at 04:40

## 2020-01-01 RX ADMIN — SODIUM CHLORIDE 1000 MILLILITER(S): 9 INJECTION, SOLUTION INTRAVENOUS at 18:12

## 2020-01-01 RX ADMIN — LEVETIRACETAM 400 MILLIGRAM(S): 250 TABLET, FILM COATED ORAL at 18:34

## 2020-01-01 RX ADMIN — Medication 100 MILLIGRAM(S): at 07:04

## 2020-01-01 RX ADMIN — Medication 2 MILLIGRAM(S): at 04:10

## 2020-01-01 RX ADMIN — Medication 50 MILLILITER(S): at 19:06

## 2020-01-01 RX ADMIN — Medication 3.28 MICROGRAM(S)/KG/MIN: at 16:10

## 2020-01-01 RX ADMIN — LEVETIRACETAM 400 MILLIGRAM(S): 250 TABLET, FILM COATED ORAL at 06:23

## 2020-01-01 RX ADMIN — PANTOPRAZOLE SODIUM 40 MILLIGRAM(S): 20 TABLET, DELAYED RELEASE ORAL at 23:24

## 2020-01-01 RX ADMIN — LEVETIRACETAM 400 MILLIGRAM(S): 250 TABLET, FILM COATED ORAL at 23:00

## 2020-06-17 NOTE — H&P ADULT - NSHPPHYSICALEXAM_GEN_ALL_CORE
WDWN male, obtunded with snoring respirations  Vital Signs Last 24 Hrs  T(C): 36.3 (17 Jun 2020 17:25), Max: 36.3 (17 Jun 2020 17:25)  T(F): 97.4 (17 Jun 2020 17:25), Max: 97.4 (17 Jun 2020 17:25)  HR: 77 (17 Jun 2020 17:40) (72 - 77)  BP: 148/74 (17 Jun 2020 17:40) (138/74 - 148/74)  BP(mean): --  RR: 18 (17 Jun 2020 17:40) (18 - 20)  SpO2: 98% (17 Jun 2020 17:40) (98% - 98%)    Obtunded  No motor response to noxious stimuli  Nonverbal  Not opening eyes  Not following commands  Pupils 6mm, fixed bilaterally  GCS=3

## 2020-06-17 NOTE — H&P ADULT - HISTORY OF PRESENT ILLNESS
41 y/o male unknown name/medical history BIBEMS for altered mental status/found on ground. Pt. is somnolent and unable to aid in history - as per EMS - found on ground unresponsive - no witnessess prior to being found on ground - unresponsive to verbal or physical stimuli, multiple beer cans near by smelling of alcohol. initial o2 sat 90% room air, patient maintaining his breathing/airway. found with old hospital bracelet on with name "unknown male" - wearing hospital socks and very unkempt. Pt. currently snoring, unable to elicit response via speaking or sternal rub. No prior medical information available at this time.

## 2020-06-17 NOTE — CHART NOTE - NSCHARTNOTEFT_GEN_A_CORE
CAPRINI SCORE [CLOT]    AGE RELATED RISK FACTORS                                                       MOBILITY RELATED FACTORS  [ ] Age 41-60 years                                            (1 Point)                  [ ] Bed rest                                                        (1 Point)  [ ] Age: 61-74 years                                           (2 Points)                 [ ] Plaster cast                                                   (2 Points)  [ ] Age= 75 years                                              (3 Points)                 [ ] Bed bound for more than 72 hours                 (2 Points)    DISEASE RELATED RISK FACTORS                                               GENDER SPECIFIC FACTORS  [ ] Edema in the lower extremities                       (1 Point)                  [ ] Pregnancy                                                     (1 Point)  [ ] Varicose veins                                               (1 Point)                  [ ] Post-partum < 6 weeks                                   (1 Point)             [ ] BMI > 25 Kg/m2                                            (1 Point)                  [ ] Hormonal therapy  or oral contraception          (1 Point)                 [ ] Sepsis (in the previous month)                        (1 Point)                  [ ] History of pregnancy complications                 (1 point)  [ ] Pneumonia or serious lung disease                                               [ ] Unexplained or recurrent                     (1 Point)           (in the previous month)                               (1 Point)  [ ] Abnormal pulmonary function test                     (1 Point)                 SURGERY RELATED RISK FACTORS  [ ] Acute myocardial infarction                              (1 Point)                 [ ]  Section                                             (1 Point)  [ ] Congestive heart failure (in the previous month)  (1 Point)               [ ] Minor surgery                                                  (1 Point)   [ ] Inflammatory bowel disease                             (1 Point)                 [ ] Arthroscopic surgery                                        (2 Points)  [ ] Central venous access                                      (2 Points)                [ ] General surgery lasting more than 45 minutes   (2 Points)       [ ] Stroke (in the previous month)                          (5 Points)               [ ] Elective arthroplasty                                         (5 Points)                                                                                                                                               HEMATOLOGY RELATED FACTORS                                                 TRAUMA RELATED RISK FACTORS  [ ] Prior episodes of VTE                                     (3 Points)                [ ] Fracture of the hip, pelvis, or leg                       (5 Points)  [ ] Positive family history for VTE                         (3 Points)                 [ ] Acute spinal cord injury (in the previous month)  (5 Points)  [ ] Prothrombin 10785 A                                     (3 Points)                 [ ] Paralysis  (less than 1 month)                             (5 Points)  [ ] Factor V Leiden                                             (3 Points)                  [ ] Multiple Trauma within 1 month                        (5 Points)  [ ] Lupus anticoagulants                                     (3 Points)                                                           [ ] Anticardiolipin antibodies                               (3 Points)                                                       [ ] High homocysteine in the blood                      (3 Points)                                             [ ] Other congenital or acquired thrombophilia      (3 Points)                                                [ ] Heparin induced thrombocytopenia                  (3 Points)                                          Total Score [    0      ]    Caprini Score 0 - 2:  Low Risk, No VTE Prophylaxis required for most patients, encourage ambulation  Caprini Score 3 - 6:  At Risk, pharmacologic VTE prophylaxis is indicated for most patients (in the absence of a contraindication)  Caprini Score Greater than or = 7:  High Risk, pharmacologic VTE prophylaxis is indicated for most patients (in the absence of a contraindication)

## 2020-06-17 NOTE — ED ADULT NURSE NOTE - CHIEF COMPLAINT QUOTE
Arrives with ems , found unresponsive on a side walk in [place]. Patient AOB , had beer next to his as per ems. Patient not responding, o2 sat was 90% ,, fs by ems = 124.  Patient was placed on 6 liter o2 nasal canula by ems , o2 sat = 98%.

## 2020-06-17 NOTE — PROGRESS NOTE ADULT - SUBJECTIVE AND OBJECTIVE BOX
SICU Consult Note     HISTORY OF PRESENT ILLNESS:  TAMELA NASH is a 40y Male unknown name/medical history BIBEMS for altered mental status/found on ground. Pt. was somnolent and unable to aid in history - as per EMS - found on ground unresponsive - no witnessess prior to being found on ground - unresponsive to verbal or physical stimuli, multiple beer cans near by smelling of alcohol. initial o2 sat 90% room air, patient maintaining his breathing/airway. Found with old hospital bracelet on with name "unknown male" - wearing hospital socks and very unkempt. Pt. currently snoring, unable to elicit response via speaking or sternal rub. No prior medical information available at this time.    Patient arrives to SICU POD 0 s/p emergent craniotomy for SDH evacuation     PAST MEDICAL HISTORY: Unknown    PAST SURGICAL HISTORY: Unknown    FAMILY HISTORY: Unknown    SOCIAL HISTORY: Unknown    CODE STATUS: Full code given patient's wishes unknown    HOME MEDICATIONS:    ALLERGIES: No Known Allergies      VITAL SIGNS:  ICU Vital Signs Last 24 Hrs  T(C): 36.3 (17 Jun 2020 17:25), Max: 36.3 (17 Jun 2020 17:25)  T(F): 97.4 (17 Jun 2020 17:25), Max: 97.4 (17 Jun 2020 17:25)  HR: 112 (17 Jun 2020 19:00) (72 - 142)  BP: 150/89 (17 Jun 2020 19:00) (138/74 - 230/115)  BP(mean): --  ABP: --  ABP(mean): --  RR: 18 (17 Jun 2020 17:40) (18 - 20)  SpO2: 98% (17 Jun 2020 17:40) (98% - 98%)      NEURO  Exam: Patient sedated, pupillary exam   Meds:propofol Infusion 10 MICROgram(s)/kG/Min IV Continuous <Continuous>      RESPIRATORY  Mechanical Ventilation:   ABG - ( 17 Jun 2020 19:20 )  pH: 7.50  /  pCO2: 34    /  pO2: 370   / HCO3: 27    / Base Excess: 2.8   /  SaO2: 100     Lactate: x      Exam: CTAB  Meds: None    CARDIOVASCULAR  VBG - ( 17 Jun 2020 17:44 )  pH: 7.40  /  pCO2: 44    /  pO2: 100   / HCO3: 26    / Base Excess: 1.8   /  SaO2: 96.6   Lactate: 4.2      Exam: No murmurs or rubs, regular rate and rhythm   Cardiac Rhythm: Normal sinus  Meds:mannitol 20% IVPB 70 Gram(s) IV Intermittent once  niCARdipine Infusion 5 mG/Hr IV Continuous <Continuous>      GI/NUTRITION  Exam: Unable to determine  Diet: NPO  Meds: None    GENITOURINARY/RENAL    Weight (kg): 70 (06-17 @ 17:25)  06-17    136  |  92<L>  |  13  ----------------------------<  150<H>  3.3<L>   |  23  |  0.76    Ca    7.5<L>      17 Jun 2020 17:44    TPro  5.5<L>  /  Alb  3.3  /  TBili  0.8  /  DBili  x   /  AST  202<H>  /  ALT  84<H>  /  AlkPhos  87  06-17    [x] Bowen catheter, indication: urine output monitoring in critically ill patient    HEMATOLOGIC  [x] VTE Prophylaxis: Held at this time 2/2 SDH                          10.6   5.38  )-----------( 132      ( 17 Jun 2020 17:44 )             31.5     PT/INR - ( 17 Jun 2020 17:44 )   PT: 10.5 SEC;   INR: 0.92          PTT - ( 17 Jun 2020 17:44 )  PTT:23.4 SEC  Transfusion: [ ] PRBC	[ ] Platelets	[ ] FFP	[ ] Cryoprecipitate      INFECTIOUS DISEASES  Meds: None  RECENT CULTURES:  None drawn    ENDOCRINE  Meds: ISS    150 on [facility] 06/17    PATIENT CARE ACCESS DEVICES:  [ ] Peripheral IV  [ ] Central Venous Line	[ ] R	[ ] L	[ ] IJ	[ ] Fem	[ ] SC	Placed:   [ ] Arterial Line		[ ] R	[ ] L	[ ] Fem	[ ] Rad	[ ] Ax	Placed:   [ ] PICC:					[ ] Mediport  [x] Urinary Catheter, Date Placed: 06/17   [x] Necessity of urinary, arterial, and venous catheters discussed    OTHER MEDICATIONS:     IMAGING STUDIES:    CT head:   Left 2.6 cm  acute on chronic holohemispheric subdural hemorrhage with 2.5 cm rightward shift, trapped right lateral ventricle, with enlarged temporal horn,  effacement of quadrigeminal plate and ambient cisterns and rightward displacement of the midbrain.    CT cervical spine: Multilevel degenerative change loss of disc height, no canal compromise. MR more sensitive for soft tissue disc or ligamentous injury may be obtained as clinically warranted.

## 2020-06-17 NOTE — CONSULT NOTE ADULT - ASSESSMENT
40 M unknown identity admitted to SICU POD 0 s/p emergent L hemicraniectomy for 2cm SDH c/b midline shift causing GCS 3 now intubated in SICU without significant improvement.     Neuro: SDH requiring emergent craniotomy 06/17   propofol gtt for sedation, currently patient GCS 4T (E1V1M2) without sedation  keppra 500 IV q12   q1hr neuro checks, pupils fixed and dilated  monitor for signs of EtOH withdrawal - last drink presumably 06/17     Resp: Intubated 06/17 for OR   450/18/5/50  Wean as tolerated    Card: BP control in setting of acute SDH   Nicardipine gtt for HTN   Goal MAP< 70 per NSGY    GI   NPO   Protonix 40 BID     : Bowen in place for UoP monitoring   Monitor UoP     Heme   Holding DVT PPX for 48hrs   H&H normal     Endo:   FIngersticks q6hrs     Social   Patient's identity unknown   Social work c/s in the am to help determine patient's identity     Lines   L rad art line  2 PIV   Bowen   ETT 40 M unknown identity admitted to SICU POD 0 s/p emergent L hemicraniectomy for 2cm SDH c/b midline shift causing GCS 3 now intubated in SICU without significant improvement.     Neuro: SDH requiring emergent craniotomy 06/17   Monitor off sedation; currently patient GCS 4T (E1V1M2) without sedation  Keppra 500 IV q12   Q1hr neuro checks, pupils fixed and dilated  Monitor for signs of EtOH withdrawal - last drink presumably 06/17     Resp: Intubated 06/17 for OR   450/18/5/50  Wean as tolerated    Card: BP control in setting of acute SDH   Nicardipine gtt for HTN   Goal MAP< 70 per NSGY    GI   NPO   Protonix 40 BID     : Bowen in place for UoP monitoring   Monitor UoP     Heme   Holding DVT PPX for 48hrs   H&H 7.4   CBC q6hrs     Endo:   FIngersticks q6hrs     Social   Patient's identity unknown   Social work c/s in the am to help determine patient's identity     Lines   ETT   L rad art line  2 PIV   Bowen

## 2020-06-17 NOTE — ED PROVIDER NOTE - PROGRESS NOTE DETAILS
Received call from radiology - subdural hematoma w/ shift on head ct.   Neurosurgery paged - currently at bedside.   Pt. intubated for airway protection without difficulty. Started on mannitol and cardine drip for intracranial pressure and elevated blood pressure.   Patient taken directly to OR for further management.  Immediate CXR ordered for intubation confirmed, tech on the way - neurosurgery deferred waiting for CXR elected patient directly to OR for further management. Received call from radiology - subdural hematoma w/ shift on head ct.   Neurosurgery paged - currently at bedside.   Pt. intubated for airway protection without difficulty. Started on mannitol and cardene drip for intracranial pressure and elevated blood pressure.   Patient taken directly to OR for further management.  Immediate CXR ordered for intubation confirmation (already with +b/l bs, +color change on colorimetry, and appropriate end tidal), XR tech on the way - neurosurgery deferred waiting for CXR elected patient directly to OR for emergent further management.

## 2020-06-17 NOTE — ED PROVIDER NOTE - OBJECTIVE STATEMENT
39 y/o male unknown name/medical history BIBEMS for altered mental status/found on ground. Pt. is extremely somnolent/lethargic and unable to aid in history - as per EMS - found on ground unresponsive - no witnessess prior to being found on ground - unresponsive to verbal or physical stimuli, multiple beer cans near by smelling of alcohol. initial o2 sat 90% room air, patient maintaining his breathing/airway. found with old hospital bracelet on with name "unknown male" - wearing hospital socks and very umkempt. Pt. currently snoring, unable to elicit response via speaking or sternal rub. No prior medical information available at this time. all other vital signs stable. 41 y/o male unknown name/medical history BIBEMS for altered mental status/found on ground. Pt. is somnolent and unable to aid in history - as per EMS - found on ground unresponsive - no witnessess prior to being found on ground - unresponsive to verbal or physical stimuli, multiple beer cans near by smelling of alcohol. initial o2 sat 90% room air, patient maintaining his breathing/airway. found with old hospital bracelet on with name "unknown male" - wearing hospital socks and very umkempt. Pt. currently snoring, unable to elicit response via speaking or sternal rub. No prior medical information available at this time. all other vital signs stable.

## 2020-06-17 NOTE — ED ADULT TRIAGE NOTE - CHIEF COMPLAINT QUOTE
Arrives with ems , found unresponsive on a side walk in [place]. Patient AOB , had beer next to his as per ems. Patient not responding, o2 sat was 90% ,, fs by ems = 124.  Patient was placed on 6 liter o2 nasal canula by ems , o2 sat = 98%.

## 2020-06-17 NOTE — BRIEF OPERATIVE NOTE - NSICDXBRIEFPROCEDURE_GEN_ALL_CORE_FT
PROCEDURES:  Craniotomy, emergent, for subdural hematoma evacuation 17-Jun-2020 21:27:01  Joshua Chahal

## 2020-06-17 NOTE — ED ADULT NURSE NOTE - OBJECTIVE STATEMENT
p/t is a 40y old male with hx ETOH received unresponsive, p/t found on the ground face down, with strong AOB, p/t not responsive  to painful stimuli, MD by the bedside, bilateral breath sounds clear and equal, abd soft to touch with bs present, no signs of bruising or trauma noted

## 2020-06-17 NOTE — ED PROVIDER NOTE - EYES, MLM
bl conjunctival erythema with dc, pupils minimally reactive to light bl conjunctival erythema with dc, pupils dilated and fixed

## 2020-06-17 NOTE — CONSULT NOTE ADULT - SUBJECTIVE AND OBJECTIVE BOX
SICU Consult Note     HISTORY OF PRESENT ILLNESS:  TAMELA NASH is a 40y Male unknown name/medical history BIBEMS for altered mental status/found on ground. Pt. was somnolent and unable to aid in history - as per EMS - found on ground unresponsive - no witnessess prior to being found on ground - unresponsive to verbal or physical stimuli, multiple beer cans near by smelling of alcohol. initial o2 sat 90% room air, patient maintaining his breathing/airway. Found with old hospital bracelet on with name "unknown male" - wearing hospital socks and very unkempt. Pt. was snoring, unable to elicit response via speaking or sternal rub. No prior medical information available at this time.    SICU consulted POD 0 s/p emergent L hemicraniectomy for SDH evacuation for q1 neuro checks, strict BP monitoring, and mechanical ventilation for airway protection and oxygenation.  2U PRBC and 1 U platelets given intraoperatively.     PAST MEDICAL HISTORY: Unknown    PAST SURGICAL HISTORY: Unknown    FAMILY HISTORY: Unknown    SOCIAL HISTORY: Unknown    CODE STATUS: Full code given patient's wishes unknown    HOME MEDICATIONS:    ALLERGIES: No Known Allergies      VITAL SIGNS:  ICU Vital Signs Last 24 Hrs  T(C): 36.3 (17 Jun 2020 17:25), Max: 36.3 (17 Jun 2020 17:25)  T(F): 97.4 (17 Jun 2020 17:25), Max: 97.4 (17 Jun 2020 17:25)  HR: 112 (17 Jun 2020 19:00) (72 - 142)  BP: 150/89 (17 Jun 2020 19:00) (138/74 - 230/115)  BP(mean): --  ABP: --  ABP(mean): --  RR: 18 (17 Jun 2020 17:40) (18 - 20)  SpO2: 98% (17 Jun 2020 17:40) (98% - 98%)      NEURO  Exam: Patient sedated, pupillary exam fixed and dilated bilaterally with some weak shoulder movement with sternal rub. No eye opening.   Meds:propofol Infusion 10 MICROgram(s)/kG/Min IV Continuous <Continuous>      RESPIRATORY  Mechanical Ventilation: 450/18/5/50  ABG - ( 17 Jun 2020 19:20 )  pH: 7.50  /  pCO2: 34    /  pO2: 370   / HCO3: 27    / Base Excess: 2.8   /  SaO2: 100     Lactate: x      Exam: CTAB  Meds: None    CARDIOVASCULAR  VBG - ( 17 Jun 2020 17:44 )  pH: 7.40  /  pCO2: 44    /  pO2: 100   / HCO3: 26    / Base Excess: 1.8   /  SaO2: 96.6   Lactate: 4.2      Exam: No murmurs or rubs, regular rate and rhythm   Cardiac Rhythm: Normal sinus  Meds:mannitol 20% IVPB 70 Gram(s) IV Intermittent once  niCARdipine Infusion 5 mG/Hr IV Continuous <Continuous>      GI/NUTRITION  Exam: Unable to determine  Diet: NPO  Meds: None    GENITOURINARY/RENAL    Weight (kg): 70 (06-17 @ 17:25)  06-17    136  |  92<L>  |  13  ----------------------------<  150<H>  3.3<L>   |  23  |  0.76    Ca    7.5<L>      17 Jun 2020 17:44    TPro  5.5<L>  /  Alb  3.3  /  TBili  0.8  /  DBili  x   /  AST  202<H>  /  ALT  84<H>  /  AlkPhos  87  06-17    [x] Bowen catheter, indication: urine output monitoring in critically ill patient    HEMATOLOGIC  [x] VTE Prophylaxis: Held at this time 2/2 SDH                          10.6   5.38  )-----------( 132      ( 17 Jun 2020 17:44 )             31.5     PT/INR - ( 17 Jun 2020 17:44 )   PT: 10.5 SEC;   INR: 0.92          PTT - ( 17 Jun 2020 17:44 )  PTT:23.4 SEC  Transfusion: [x] 2 UPRBC	 [x]1 U Platelets	[ ] FFP	[ ] Cryoprecipitate      INFECTIOUS DISEASES  Meds: None  RECENT CULTURES:  None drawn    ENDOCRINE  Meds: ISS    150 on CMP 06/17    PATIENT CARE ACCESS DEVICES:  [ ] Peripheral IV  [ ] Central Venous Line	[ ] R	[ ] L	[ ] IJ	[ ] Fem	[ ] SC	Placed:   [x] Arterial Line		[ ] R	[x] L	[ ] Fem	[x] Rad	[ ] Ax	Placed:   [ ] PICC:					[ ] Mediport  [x] Urinary Catheter, Date Placed: 06/17   [x] Necessity of urinary, arterial, and venous catheters discussed    OTHER MEDICATIONS:     IMAGING STUDIES:    CT head:   Left 2.6 cm  acute on chronic holohemispheric subdural hemorrhage with 2.5 cm rightward shift, trapped right lateral ventricle, with enlarged temporal horn,  effacement of quadrigeminal plate and ambient cisterns and rightward displacement of the midbrain.    CT cervical spine: Multilevel degenerative change loss of disc height, no canal compromise. MR more sensitive for soft tissue disc or ligamentous injury may be obtained as clinically warranted. SICU Consult Note     HISTORY OF PRESENT ILLNESS:  TAMELA NASH is a 40y Male unknown name/medical history BIBEMS for altered mental status/found on ground. Pt. was somnolent and unable to aid in history - as per EMS - found on ground unresponsive - no witnessess prior to being found on ground - unresponsive to verbal or physical stimuli, multiple beer cans near by smelling of alcohol. initial o2 sat 90% room air, patient maintaining his breathing/airway. Found with old hospital bracelet on with name "unknown male" - wearing hospital socks and very unkempt. Pt. was snoring, unable to elicit response via speaking or sternal rub. No prior medical information available at this time. Patient was intubated and taken to the OR.     SICU consulted POD 0 s/p emergent L hemicraniectomy for SDH evacuation for q1 neuro checks, strict BP monitoring, and mechanical ventilation for airway protection and oxygenation.  2U PRBC and 1 U platelets given intraoperatively.     PAST MEDICAL HISTORY: Unknown    PAST SURGICAL HISTORY: Unknown    FAMILY HISTORY: Unknown    SOCIAL HISTORY: Unknown    CODE STATUS: Full code given patient's wishes unknown    HOME MEDICATIONS:    ALLERGIES: No Known Allergies      VITAL SIGNS:  ICU Vital Signs Last 24 Hrs  T(C): 36.3 (17 Jun 2020 17:25), Max: 36.3 (17 Jun 2020 17:25)  T(F): 97.4 (17 Jun 2020 17:25), Max: 97.4 (17 Jun 2020 17:25)  HR: 112 (17 Jun 2020 19:00) (72 - 142)  BP: 150/89 (17 Jun 2020 19:00) (138/74 - 230/115)  BP(mean): --  ABP: --  ABP(mean): --  RR: 18 (17 Jun 2020 17:40) (18 - 20)  SpO2: 98% (17 Jun 2020 17:40) (98% - 98%)      NEURO  Exam: Patient sedated, pupillary exam fixed and dilated bilaterally with some weak shoulder movement with sternal rub. No eye opening.   Meds:propofol Infusion 10 MICROgram(s)/kG/Min IV Continuous <Continuous>      RESPIRATORY  Mechanical Ventilation: 450/18/5/50  ABG - ( 17 Jun 2020 19:20 )  pH: 7.50  /  pCO2: 34    /  pO2: 370   / HCO3: 27    / Base Excess: 2.8   /  SaO2: 100     Lactate: x      Exam: CTAB  Meds: None    CARDIOVASCULAR  VBG - ( 17 Jun 2020 17:44 )  pH: 7.40  /  pCO2: 44    /  pO2: 100   / HCO3: 26    / Base Excess: 1.8   /  SaO2: 96.6   Lactate: 4.2      Exam: No murmurs or rubs, regular rate and rhythm   Cardiac Rhythm: Normal sinus  Meds:mannitol 20% IVPB 70 Gram(s) IV Intermittent once  niCARdipine Infusion 5 mG/Hr IV Continuous <Continuous>      GI/NUTRITION  Exam: Unable to determine  Diet: NPO  Meds: None    GENITOURINARY/RENAL    Weight (kg): 70 (06-17 @ 17:25)  06-17    136  |  92<L>  |  13  ----------------------------<  150<H>  3.3<L>   |  23  |  0.76    Ca    7.5<L>      17 Jun 2020 17:44    TPro  5.5<L>  /  Alb  3.3  /  TBili  0.8  /  DBili  x   /  AST  202<H>  /  ALT  84<H>  /  AlkPhos  87  06-17    [x] Bowen catheter, indication: urine output monitoring in critically ill patient    HEMATOLOGIC  [x] VTE Prophylaxis: Held at this time 2/2 SDH                          10.6   5.38  )-----------( 132      ( 17 Jun 2020 17:44 )             31.5     PT/INR - ( 17 Jun 2020 17:44 )   PT: 10.5 SEC;   INR: 0.92          PTT - ( 17 Jun 2020 17:44 )  PTT:23.4 SEC  Transfusion: [x] 2 UPRBC	 [x]1 U Platelets	[ ] FFP	[ ] Cryoprecipitate      INFECTIOUS DISEASES  Meds: None  RECENT CULTURES:  None drawn    ENDOCRINE  Meds: ISS    150 on CMP 06/17    PATIENT CARE ACCESS DEVICES:  [ ] Peripheral IV  [ ] Central Venous Line	[ ] R	[ ] L	[ ] IJ	[ ] Fem	[ ] SC	Placed:   [x] Arterial Line		[ ] R	[x] L	[ ] Fem	[x] Rad	[ ] Ax	Placed:   [ ] PICC:					[ ] Mediport  [x] Urinary Catheter, Date Placed: 06/17   [x] Necessity of urinary, arterial, and venous catheters discussed    OTHER MEDICATIONS:     IMAGING STUDIES:    CT head:   Left 2.6 cm  acute on chronic holohemispheric subdural hemorrhage with 2.5 cm rightward shift, trapped right lateral ventricle, with enlarged temporal horn,  effacement of quadrigeminal plate and ambient cisterns and rightward displacement of the midbrain.    CT cervical spine: Multilevel degenerative change loss of disc height, no canal compromise. MR more sensitive for soft tissue disc or ligamentous injury may be obtained as clinically warranted.

## 2020-06-17 NOTE — ED PROCEDURE NOTE - NS ED PROCEUDURE1 POST INTUBATION REVIEW
Breath sounds bilateral/Appropriate capnography/Positive end tidal Co2 noted/taken to OR emergently prior to xr

## 2020-06-17 NOTE — ED PROVIDER NOTE - ATTENDING CONTRIBUTION TO CARE
agree with above hpi  on my exam  GEN - somnolent, snoring, not arousable to voice or sternal rub  HEAD - NC/AT   EYES- pupils 5mm, sluggish, no conjunctival injection  ENT: Airway patent, + gag, dry mm, Oral cavity and pharynx normal.    NECK: Neck supple  PULMONARY - CTA b/l, symmetric breath sounds.   CARDIAC -s1s2, RRR, no M,G,R  ABDOMEN - +BS, nondistended, soft  BACK - no spinal stepoff/deformity  EXTREMITIES - no gross deformity, pulses equal b/l  SKIN - no rash or bruising   NEUROLOGIC - unable to assess 2/2 mental status  Patient bibems unresponsive-found on street with bottles of alcohol next to him, protecting airway in ed, no drooling/pooling, +gag, snoring, desats to 90 at times, agree with above hpi  on my exam  GEN - somnolent, snoring, not arousable to voice or sternal rub  HEAD - NC/AT   EYES- pupils 5mm, sluggish, no conjunctival injection  ENT: Airway patent, + gag, dry mm, Oral cavity and pharynx normal.    NECK: Neck supple  PULMONARY - CTA b/l, symmetric breath sounds.   CARDIAC -s1s2, RRR, no M,G,R  ABDOMEN - +BS, nondistended, soft  BACK - no spinal stepoff/deformity  EXTREMITIES - no gross deformity, pulses equal b/l  SKIN - no rash or bruising   NEUROLOGIC - unable to assess 2/2 mental status  Patient bibems unresponsive-found on street with bottles of alcohol next to him, protecting airway in ed, no drooling/pooling, +gag, snoring, desats to 90 at times, plan for stat ct-eval for intracranial bleed/mass, labs to eval for elec disturbance, organ dysfunction, intox, monitor, reass. agree with above hpi  on my exam  GEN - somnolent, snoring, not arousable to voice or sternal rub  HEAD - NC/AT   EYES- pupils 5mm, fixed, no conjunctival injection  ENT: Airway patent, + gag, dry mm, Oral cavity and pharynx normal.    NECK: Neck supple  PULMONARY - CTA b/l, symmetric breath sounds.   CARDIAC -s1s2, RRR, no M,G,R  ABDOMEN - +BS, nondistended, soft  BACK - no spinal stepoff/deformity  EXTREMITIES - no gross deformity, pulses equal b/l  SKIN - no rash or bruising   NEUROLOGIC - unable to assess 2/2 mental status  Patient bibems unresponsive-found on street with bottles of alcohol next to him, protecting airway in ed, no drooling/pooling, +gag, snoring, desats to 90 at times, plan for stat ct-eval for intracranial bleed/mass, labs to eval for elec disturbance, organ dysfunction, intox, monitor, reass.

## 2020-06-17 NOTE — ED PROVIDER NOTE - CLINICAL SUMMARY MEDICAL DECISION MAKING FREE TEXT BOX
39 y/o male unknown name/medical history found unresponsive on street   -unclear etiology for ams at this time - r/o etoh/drug intoxication, r/o intracranial injury, infection  -labs, serum tox, vbg  -ct head/cspine 41 y/o male unknown name/medical history found unresponsive on street   -unclear etiology for ams at this time - r/o etoh/drug intoxication, r/o intracranial injury, infection  -labs, serum tox, vbg  -stat ct head/cspine

## 2020-06-17 NOTE — H&P ADULT - NSHPLABSRESULTS_GEN_ALL_CORE
10.6   5.38  )-----------( 132      ( 17 Jun 2020 17:44 )             31.5     06-17    136  |  92<L>  |  13  ----------------------------<  150<H>  3.3<L>   |  23  |  0.76    Ca    7.5<L>      17 Jun 2020 17:44    TPro  5.5<L>  /  Alb  3.3  /  TBili  0.8  /  DBili  x   /  AST  202<H>  /  ALT  84<H>  /  AlkPhos  87  06-17    PT/INR - ( 17 Jun 2020 17:44 )   PT: 10.5 SEC;   INR: 0.92          PTT - ( 17 Jun 2020 17:44 )  PTT:23.4 SEC    Noncontrast head CT: Left acute SDH approximately 2CM in depth with 2.2CM left to right shift

## 2020-06-17 NOTE — PROGRESS NOTE ADULT - ASSESSMENT
40 M unknown identity admitted to SICU POD 0 s/p emergent craniotomy for 2cm SDH c/b midline shift causing GCS 3 now intubated and sedated.     Neuro: SDH requiring emergent craniotomy 06/17   q1hr neuro checks   Keppra   monitor for signs of EtOH withdrawal - last drink presumably 06/17     Resp: Intubated 06/17 for OR     Card: BP control in setting of acute SDH   Nicardipine gtt for HTN   BP parameters     GI   NPO   OGT in place   Protonix 40 BID     : Bowen in place for UoP monitoring   Monitor UoP     Heme   Holding DVT PPX   H&H normal     Endo:   FIngersticks q6hrs     Lines

## 2020-06-17 NOTE — ED ADULT NURSE NOTE - NSIMPLEMENTINTERV_GEN_ALL_ED
Implemented All Fall Risk Interventions:  [place] to call system. Call bell, personal items and telephone within reach. Instruct patient to call for assistance. Room bathroom lighting operational. Non-slip footwear when patient is off stretcher. Physically safe environment: no spills, clutter or unnecessary equipment. Stretcher in lowest position, wheels locked, appropriate side rails in place. Provide visual cue, wrist band, yellow gown, etc. Monitor gait and stability. Monitor for mental status changes and reorient to person, place, and time. Review medications for side effects contributing to fall risk. Reinforce activity limits and safety measures with patient and family.

## 2020-06-18 NOTE — PROGRESS NOTE ADULT - ATTENDING COMMENTS
Agree with notes of health care providers on my service (PAs, Residents and House Staff).  The patient is in SICU with diagnosis mentioned in the note.    The patient is a critical care patient with life threatening hemodynamic and metabolic instability in SICU.  Risk benefit analyzes discussed.  The documentation of the total time spent 55-75 minutes ( 0800Hrs-0920Hrs in AM ).  40 M unknown identity admitted to SICU POD 1 s/p emergent L hemicraniectomy for 2cm SDH c/b midline shift causing GCS 3 now intubated in SICU without significant improvement.   I have personally examined the patient, reviewed data and laboratory tests/x-rays and all pertinent electronic images.EXAM  NEUROLOGY  GCS: 4   Exam: + decerebrate posturing with noxious stimuli; No eye opening. Pupils remain fixed, dilated, + corneal reflexes. +gag reflex    HEENT  Exam: +Subdural drain in place with SS output. + ETT in place.    RESPIRATORY  Exam: Lungs clear to auscultation, Normal expansion/effort.   Mechanical Ventilation: 450/18/5/30%    CARDIOVASCULAR  Exam: S1, S2.  Regular rate and rhythm.      GI/NUTRITION  Exam: Abdomen soft, Non-tender, Non-distended.   Current Diet:  NPO    VASCULAR  Exam: Extremities warm  The assessment and plan is specified below:  Neuro: SDH requiring emergent craniotomy 06/17   - Sedate with propofol for vent synchrony  - Keppra 500 IV q12   - Q1hr neuro checks, pupils fixed and dilated  - Monitor for signs of EtOH withdrawal - last drink presumably 06/17     Resp: Intubated 06/17 for OR   - On ACMV, 450/18/5/30%  - Consider SBT if neurologic status improves    Card: BP control in setting of acute SDH   - Hypotensive, initially on phenylephrine, now on norepinephrine and vasopressin   - Tachycardic to 160s, given adenosine x2 and cardizem x1     GI   - NPO   - GI ppx: Protonix 40 BID     :   - Monitor UoP   - Bowen to gravity    Heme   - Holding DVT PPX for 48hrs   - H&H 9.3/26.8  - Given 2uPRBC, 1uplt intraop  - CBC q6hrs     Endo:   - Fingersticks q6hrs     Social   - Patient's identity unknown   - Social work c/s in the am to help determine patient's identity   - Prognosis poor, will reach out to organ donation

## 2020-06-18 NOTE — PROGRESS NOTE ADULT - SUBJECTIVE AND OBJECTIVE BOX
Neurosurgery postop  patient remains intubated on vent  No sedation  Vital Signs Last 24 Hrs  T(C): 37.4 (18 Jun 2020 00:00), Max: 37.4 (18 Jun 2020 00:00)  T(F): 99.4 (18 Jun 2020 00:00), Max: 99.4 (18 Jun 2020 00:00)  HR: 97 (18 Jun 2020 00:00) (72 - 142)  BP: 132/86 (18 Jun 2020 00:00) (123/68 - 230/115)  BP(mean): 96 (18 Jun 2020 00:00) (79 - 96)  RR: 18 (18 Jun 2020 00:00) (18 - 20)  SpO2: 98% (18 Jun 2020 00:00) (98% - 100%)    Unresponsive  Not opening eyes, not following commands  Pupils 5mm bilaterally, fixed  Decerebrate posturing  +Corneals  +Gag  Increased heart rate in response to noxious stimuli    Dressing C/D/I    CHARLES: minimal    MEDICATIONS  (STANDING):  acetaminophen  IVPB .. 1000 milliGRAM(s) IV Intermittent once  ceFAZolin   IVPB 2000 milliGRAM(s) IV Intermittent every 8 hours  chlorhexidine 0.12% Liquid 15 milliLiter(s) Oral Mucosa every 12 hours  HYDROmorphone  Injectable 0.5 milliGRAM(s) IV Push once  levETIRAcetam  IVPB 500 milliGRAM(s) IV Intermittent every 12 hours  niCARdipine Infusion 5 mG/Hr (25 mL/Hr) IV Continuous <Continuous>  pantoprazole  Injectable 40 milliGRAM(s) IV Push every 12 hours    MEDICATIONS  (PRN):                          10.1   9.07  )-----------( 134      ( 18 Jun 2020 02:17 )             29.8     06-17    142  |  100  |  10  ----------------------------<  169<H>  3.9   |  24  |  0.73    Ca    8.3<L>      17 Jun 2020 21:50    TPro  4.4<L>  /  Alb  3.1<L>  /  TBili  1.1  /  DBili  x   /  AST  117<H>  /  ALT  49<H>  /  AlkPhos  57  06-17    PT/INR - ( 17 Jun 2020 17:44 )   PT: 10.5 SEC;   INR: 0.92          PTT - ( 17 Jun 2020 17:44 )  PTT:23.4 SEC    COVID-19 PCR: NotDetec (17 Jun 2020 22:05)

## 2020-06-18 NOTE — PROGRESS NOTE ADULT - ASSESSMENT
40 M unknown identity admitted to SICU POD 1 s/p emergent L hemicraniectomy for 2cm SDH c/b midline shift causing GCS 3 now intubated in SICU without significant improvement.     Neuro: SDH requiring emergent craniotomy 06/17   - Monitor off sedation; currently patient GCS 4T (E1V1M2) without sedation  - Keppra 500 IV q12   - Q1hr neuro checks, pupils fixed and dilated  - Monitor for signs of EtOH withdrawal - last drink presumably 06/17     Resp: Intubated 06/17 for OR   - On ACMV, 450/18/5/30%  - Consider SBT if neurologic status improves    Card: BP control in setting of acute SDH   - Weaned off nicardipine gtt for HTN,  Maintain SBP <150   - Weaned off phenylephrine for hypotension    GI   - NPO   - GI ppx: Protonix 40 BID     :   - Monitor UoP   - Bowen to gravity    Heme   - Holding DVT PPX for 48hrs   - H&H 9.3/26.8  - Given 2uPRBC, 1uplt intraop  - CBC q6hrs     Endo:   - Fingersticks q6hrs     Social   - Patient's identity unknown   - Social work c/s in the am to help determine patient's identity   - GOC discussion     Lines   - ETT   - L rad art line  - 2 PIV   - Bowen   - subdural drain 40 M unknown identity admitted to SICU POD 1 s/p emergent L hemicraniectomy for 2cm SDH c/b midline shift causing GCS 3 now intubated in SICU without significant improvement.     Neuro: SDH requiring emergent craniotomy 06/17   - Sedate with propofol for vent synchrony  - Keppra 500 IV q12   - Q1hr neuro checks, pupils fixed and dilated  - Monitor for signs of EtOH withdrawal - last drink presumably 06/17     Resp: Intubated 06/17 for OR   - On ACMV, 450/18/5/30%  - Consider SBT if neurologic status improves    Card: BP control in setting of acute SDH   - Hypotensive, initially on phenylephrine, now on norepinephrine and vasopressin   - Tachycardic to 160s, given adenosine x2 and cardizem x1     GI   - NPO   - GI ppx: Protonix 40 BID     :   - Monitor UoP   - Bowen to gravity    Heme   - Holding DVT PPX for 48hrs   - H&H 9.3/26.8  - Given 2uPRBC, 1uplt intraop  - CBC q6hrs     Endo:   - Fingersticks q6hrs     Social   - Patient's identity unknown   - Social work c/s in the am to help determine patient's identity   - Prognosis poor, will reach out to organ donation    Lines   - ETT   - L rad art line  - 2 PIV   - Bowen   - subdural drain

## 2020-06-18 NOTE — PROGRESS NOTE ADULT - SUBJECTIVE AND OBJECTIVE BOX
SICU Daily Progress Note  =====================================================  Interval/Overnight Events:    - Pt with decerebrate posturing, pupils remain fixed, dilated, no corneal reflexes; GCS 4  - Cardene gtt for HTN, weaned off overnight, transiently on phenylephrine for hypotension, now off vasopressor    POD #    1      	SICU Day # 2    HPI:   TAMELA NASH is a 40y Male unknown name/medical history BIBEMS for altered mental status/found on ground. Pt. was somnolent and unable to aid in history - as per EMS - found on ground unresponsive - no witnessess prior to being found on ground - unresponsive to verbal or physical stimuli, multiple beer cans near by smelling of alcohol. initial o2 sat 90% room air, patient maintaining his breathing/airway. Found with old hospital bracelet on with name "unknown male" - wearing hospital socks and very unkempt. Pt. was snoring, unable to elicit response via speaking or sternal rub. No prior medical information available at this time. Patient was intubated and taken to the OR.     SICU consulted POD 0 s/p emergent L hemicraniectomy for SDH evacuation for q1 neuro checks, strict BP monitoring, and mechanical ventilation for airway protection and oxygenation.  2U PRBC and 1 U platelets given intraoperatively.     Allergies: No Known Allergies      MEDICATIONS:   --------------------------------------------------------------------------------------  Neurologic Medications  levETIRAcetam  IVPB 500 milliGRAM(s) IV Intermittent every 12 hours    Respiratory Medications    Cardiovascular Medications  niCARdipine Infusion 5 mG/Hr IV Continuous <Continuous>  phenylephrine    Infusion 0.4 MICROgram(s)/kG/Min IV Continuous <Continuous>    Gastrointestinal Medications  pantoprazole  Injectable 40 milliGRAM(s) IV Push every 12 hours  sodium chloride 0.9% Bolus 500 milliLiter(s) IV Bolus once    Genitourinary Medications    Hematologic/Oncologic Medications    Antimicrobial/Immunologic Medications  ceFAZolin   IVPB 2000 milliGRAM(s) IV Intermittent every 8 hours    Endocrine/Metabolic Medications    Topical/Other Medications  chlorhexidine 0.12% Liquid 15 milliLiter(s) Oral Mucosa every 12 hours    --------------------------------------------------------------------------------------    VITAL SIGNS, INS/OUTS (last 24 hours):  --------------------------------------------------------------------------------------  T(C): 37.4 (06-18-20 @ 00:00), Max: 37.4 (06-18-20 @ 00:00)  HR: 97 (06-18-20 @ 00:00) (72 - 142)  BP: 132/86 (06-18-20 @ 00:00) (123/68 - 230/115)  BP(mean): 96 (06-18-20 @ 00:00) (79 - 96)  ABP: 136/74 (06-18-20 @ 00:00) (94/44 - 136/74)  ABP(mean): 91 (06-18-20 @ 00:00) (75 - 91)  RR: 18 (06-18-20 @ 00:00) (18 - 20)  SpO2: 98% (06-18-20 @ 00:00) (98% - 100%)  Wt(kg): --  CVP(mm Hg): --  CI: --  CAPILLARY BLOOD GLUCOSE       N/A      06-17 @ 07:01  -  06-18 @ 01:35  --------------------------------------------------------  IN:    IV PiggyBack: 175 mL    niCARdipine Infusion: 25 mL    phenylephrine   Infusion: 26.3 mL  Total IN: 226.3 mL    OUT:    Voided: 335 mL  Total OUT: 335 mL    Total NET: -108.7 mL        --------------------------------------------------------------------------------------    EXAM  NEUROLOGY  GCS: 4   Exam: + decerebrate posturing with noxious stimuli; No eye opening. Pupils remain fixed, dilated, no corneal reflexes.     HEENT  Exam: +Subdural drain in place with SS output. + ETT in place.    RESPIRATORY  Exam: Lungs clear to auscultation, Normal expansion/effort.   Mechanical Ventilation: 450/18/5/30%    CARDIOVASCULAR  Exam: S1, S2.  Regular rate and rhythm.      GI/NUTRITION  Exam: Abdomen soft, Non-tender, Non-distended.   Current Diet:  NPO    VASCULAR  Exam: Extremities warm, pink, well-perfused.     MUSCULOSKELETAL  Exam: Extends upper extremities/ decerebrate to noxious stimuli. Does not move extremities spontaneously.    SKIN  Exam: Good skin turgor, no skin breakdown.     METABOLIC/FLUIDS/ELECTROLYTES  sodium chloride 0.9% Bolus 500 milliLiter(s) IV Bolus once      HEMATOLOGIC  [x] VTE Prophylaxis:   Transfusions:	[] PRBC	[] Platelets		[] FFP	[] Cryoprecipitate    INFECTIOUS DISEASE  Antimicrobials/Immunologic Medications:  ceFAZolin   IVPB 2000 milliGRAM(s) IV Intermittent every 8 hours    Day # 1     of   Ancef    Tubes/Lines/Drains    [x] Peripheral IV  [] Central Venous Line     	[] R	[] L	[] IJ	[] Fem	[] SC	Date Placed:   [x] Arterial Line		[] R	[x] L	[] Fem	[x] Rad	[] Ax	Date Placed: 6/17/20  [] PICC		[] Midline		[] Mediport  [x] Urinary Catheter		Date Placed: 6/17/20  [x] Necessity of urinary, arterial, and venous catheters discussed    LABS  --------------------------------------------------------------------------------------  ((Insert [facility] Labs here))***  --------------------------------------------------------------------------------------    OTHER LABORATORY:     IMAGING STUDIES:   CXR: SICU Daily Progress Note  =====================================================  Interval/Overnight Events:    - Recieved from OR s/p L hemicraniectomy w SDH evacuation  - ; 2U PRBC and 1 U platelets given intraop.    - Propofol held;  Pt with decerebrate posturing, pupils remain fixed, dilated, + corneal reflex. +gag; GCS 4  - Cardene gtt for HTN, weaned off overnight, transiently on phenylephrine for hypotension, now off vasopressor  - 500cc NS bolus given for tachycardia to 130s       POD #    1      	SICU Day # 2    HPI:   TAMELA NASH is a 40y Male unknown name/medical history BIBEMS for altered mental status/found on ground. Pt. was somnolent and unable to aid in history - as per EMS - found on ground unresponsive - no witnessess prior to being found on ground - unresponsive to verbal or physical stimuli, multiple beer cans near by smelling of alcohol. initial o2 sat 90% room air, patient maintaining his breathing/airway. Found with old hospital bracelet on with name "unknown male" - wearing hospital socks and very unkempt. Pt. was snoring, unable to elicit response via speaking or sternal rub. No prior medical information available at this time. Patient was intubated and taken to the OR.     SICU consulted POD 0 s/p emergent L hemicraniectomy for SDH evacuation for q1 neuro checks, strict BP monitoring, and mechanical ventilation for airway protection and oxygenation.  2U PRBC and 1 U platelets given intraoperatively.     Allergies: No Known Allergies      MEDICATIONS:   --------------------------------------------------------------------------------------  Neurologic Medications  levETIRAcetam  IVPB 500 milliGRAM(s) IV Intermittent every 12 hours    Respiratory Medications    Cardiovascular Medications  niCARdipine Infusion 5 mG/Hr IV Continuous <Continuous>  phenylephrine    Infusion 0.4 MICROgram(s)/kG/Min IV Continuous <Continuous>    Gastrointestinal Medications  pantoprazole  Injectable 40 milliGRAM(s) IV Push every 12 hours  sodium chloride 0.9% Bolus 500 milliLiter(s) IV Bolus once    Genitourinary Medications    Hematologic/Oncologic Medications    Antimicrobial/Immunologic Medications  ceFAZolin   IVPB 2000 milliGRAM(s) IV Intermittent every 8 hours    Endocrine/Metabolic Medications    Topical/Other Medications  chlorhexidine 0.12% Liquid 15 milliLiter(s) Oral Mucosa every 12 hours    --------------------------------------------------------------------------------------    VITAL SIGNS, INS/OUTS (last 24 hours):  --------------------------------------------------------------------------------------  T(C): 37.4 (06-18-20 @ 00:00), Max: 37.4 (06-18-20 @ 00:00)  HR: 97 (06-18-20 @ 00:00) (72 - 142)  BP: 132/86 (06-18-20 @ 00:00) (123/68 - 230/115)  BP(mean): 96 (06-18-20 @ 00:00) (79 - 96)  ABP: 136/74 (06-18-20 @ 00:00) (94/44 - 136/74)  ABP(mean): 91 (06-18-20 @ 00:00) (75 - 91)  RR: 18 (06-18-20 @ 00:00) (18 - 20)  SpO2: 98% (06-18-20 @ 00:00) (98% - 100%)  Wt(kg): --  CVP(mm Hg): --  CI: --  CAPILLARY BLOOD GLUCOSE       N/A      06-17 @ 07:01  -  06-18 @ 01:35  --------------------------------------------------------  IN:    IV PiggyBack: 175 mL    niCARdipine Infusion: 25 mL    phenylephrine   Infusion: 26.3 mL  Total IN: 226.3 mL    OUT:    Voided: 335 mL  Total OUT: 335 mL    Total NET: -108.7 mL        --------------------------------------------------------------------------------------    EXAM  NEUROLOGY  GCS: 4   Exam: + decerebrate posturing with noxious stimuli; No eye opening. Pupils remain fixed, dilated, + corneal reflexes. +gag reflex    HEENT  Exam: +Subdural drain in place with SS output. + ETT in place.    RESPIRATORY  Exam: Lungs clear to auscultation, Normal expansion/effort.   Mechanical Ventilation: 450/18/5/30%    CARDIOVASCULAR  Exam: S1, S2.  Regular rate and rhythm.      GI/NUTRITION  Exam: Abdomen soft, Non-tender, Non-distended.   Current Diet:  NPO    VASCULAR  Exam: Extremities warm, pink, well-perfused.     MUSCULOSKELETAL  Exam: Extends upper extremities/ decerebrate to noxious stimuli. Does not move extremities spontaneously.    SKIN  Exam: Good skin turgor, no skin breakdown.     METABOLIC/FLUIDS/ELECTROLYTES  sodium chloride 0.9% Bolus 500 milliLiter(s) IV Bolus once      HEMATOLOGIC  [x] VTE Prophylaxis:   Transfusions:	[] PRBC	[] Platelets		[] FFP	[] Cryoprecipitate    INFECTIOUS DISEASE  Antimicrobials/Immunologic Medications:  ceFAZolin   IVPB 2000 milliGRAM(s) IV Intermittent every 8 hours    Day # 1     of   Ancef    Tubes/Lines/Drains    [x] Peripheral IV  [] Central Venous Line     	[] R	[] L	[] IJ	[] Fem	[] SC	Date Placed:   [x] Arterial Line		[] R	[x] L	[] Fem	[x] Rad	[] Ax	Date Placed: 6/17/20  [] PICC		[] Midline		[] Mediport  [x] Urinary Catheter		Date Placed: 6/17/20  [x] Necessity of urinary, arterial, and venous catheters discussed    LABS  --------------------------------------------------------------------------------------  ((Insert SICU Labs here))***  --------------------------------------------------------------------------------------    OTHER LABORATORY:     IMAGING STUDIES:   CXR: SICU Daily Progress Note  =====================================================  Interval/Overnight Events:    - Recieved from OR s/p L hemicraniectomy w SDH evacuation  - ; 2U PRBC and 1 U platelets given intraop.    - Propofol held;  Pt with decerebrate posturing, pupils remain fixed, dilated, + corneal reflex. +gag; GCS 4  - Cardene gtt for HTN, weaned off overnight, transiently on phenylephrine for hypotension, now off vasopressor  - 500cc NS bolus given for tachycardia to 130s       POD #    1      	SICU Day # 2    HPI:   TAMELA NASH is a 40y Male unknown name/medical history BIBEMS for altered mental status/found on ground. Pt. was somnolent and unable to aid in history - as per EMS - found on ground unresponsive - no witnessess prior to being found on ground - unresponsive to verbal or physical stimuli, multiple beer cans near by smelling of alcohol. initial o2 sat 90% room air, patient maintaining his breathing/airway. Found with old hospital bracelet on with name "unknown male" - wearing hospital socks and very unkempt. Pt. was snoring, unable to elicit response via speaking or sternal rub. No prior medical information available at this time. Patient was intubated and taken to the OR.     SICU consulted POD 0 s/p emergent L hemicraniectomy for SDH evacuation for q1 neuro checks, strict BP monitoring, and mechanical ventilation for airway protection and oxygenation.  2U PRBC and 1 U platelets given intraoperatively.     Allergies: No Known Allergies      MEDICATIONS:   --------------------------------------------------------------------------------------  Neurologic Medications  levETIRAcetam  IVPB 500 milliGRAM(s) IV Intermittent every 12 hours    Respiratory Medications    Cardiovascular Medications  niCARdipine Infusion 5 mG/Hr IV Continuous <Continuous>  phenylephrine    Infusion 0.4 MICROgram(s)/kG/Min IV Continuous <Continuous>    Gastrointestinal Medications  pantoprazole  Injectable 40 milliGRAM(s) IV Push every 12 hours  sodium chloride 0.9% Bolus 500 milliLiter(s) IV Bolus once    Genitourinary Medications    Hematologic/Oncologic Medications    Antimicrobial/Immunologic Medications  ceFAZolin   IVPB 2000 milliGRAM(s) IV Intermittent every 8 hours    Endocrine/Metabolic Medications    Topical/Other Medications  chlorhexidine 0.12% Liquid 15 milliLiter(s) Oral Mucosa every 12 hours    --------------------------------------------------------------------------------------    VITAL SIGNS, INS/OUTS (last 24 hours):  --------------------------------------------------------------------------------------  T(C): 37.4 (06-18-20 @ 00:00), Max: 37.4 (06-18-20 @ 00:00)  HR: 97 (06-18-20 @ 00:00) (72 - 142)  BP: 132/86 (06-18-20 @ 00:00) (123/68 - 230/115)  BP(mean): 96 (06-18-20 @ 00:00) (79 - 96)  ABP: 136/74 (06-18-20 @ 00:00) (94/44 - 136/74)  ABP(mean): 91 (06-18-20 @ 00:00) (75 - 91)  RR: 18 (06-18-20 @ 00:00) (18 - 20)  SpO2: 98% (06-18-20 @ 00:00) (98% - 100%)  Wt(kg): --  CVP(mm Hg): --  CI: --  CAPILLARY BLOOD GLUCOSE       N/A      06-17 @ 07:01  -  06-18 @ 01:35  --------------------------------------------------------  IN:    IV PiggyBack: 175 mL    niCARdipine Infusion: 25 mL    phenylephrine   Infusion: 26.3 mL  Total IN: 226.3 mL    OUT:    Voided: 335 mL  Total OUT: 335 mL    Total NET: -108.7 mL        --------------------------------------------------------------------------------------    EXAM  NEUROLOGY  GCS: 4   Exam: + decerebrate posturing with noxious stimuli; No eye opening. Pupils remain fixed, dilated, + corneal reflexes. +gag reflex    HEENT  Exam: +Subdural drain in place with SS output. + ETT in place.    RESPIRATORY  Exam: Lungs clear to auscultation, Normal expansion/effort.   Mechanical Ventilation: 450/18/5/30%    CARDIOVASCULAR  Exam: S1, S2.  Regular rate and rhythm.      GI/NUTRITION  Exam: Abdomen soft, Non-tender, Non-distended.   Current Diet:  NPO    VASCULAR  Exam: Extremities warm, pink, well-perfused.     MUSCULOSKELETAL  Exam: Extends upper extremities/ decerebrate to noxious stimuli. Does not move extremities spontaneously.    SKIN  Exam: Good skin turgor, no skin breakdown.     METABOLIC/FLUIDS/ELECTROLYTES  sodium chloride 0.9% Bolus 500 milliLiter(s) IV Bolus once      HEMATOLOGIC  [x] VTE Prophylaxis:   Transfusions:	[] PRBC	[] Platelets		[] FFP	[] Cryoprecipitate    INFECTIOUS DISEASE  Antimicrobials/Immunologic Medications:  ceFAZolin   IVPB 2000 milliGRAM(s) IV Intermittent every 8 hours    Day # 1     of   Ancef    Tubes/Lines/Drains    [x] Peripheral IV  [] Central Venous Line     	[] R	[] L	[] IJ	[] Fem	[] SC	Date Placed:   [x] Arterial Line		[] R	[x] L	[] Fem	[x] Rad	[] Ax	Date Placed: 6/17/20  [] PICC		[] Midline		[] Mediport  [x] Urinary Catheter		Date Placed: 6/17/20  [x] Necessity of urinary, arterial, and venous catheters discussed    LABS  --------------------------------------------------------------------------------------  ((Insert SICU Labs here))***  --------------------------------------------------------------------------------------    OTHER LABORATORY:     IMAGING STUDIES:   < from: CT Abdomen and Pelvis w/ IV Cont (06.18.20 @ 09:13) >  FINDINGS:  LOWER CHEST: Cluster of nodules in the posterior right lower lobe.    LIVER: Steatosis.  BILE DUCTS: Normal caliber.  GALLBLADDER: Within normal limits.  SPLEEN: Within normal limits.  PANCREAS: Within normal limits.  ADRENALS: Within normal limits.  KIDNEYS/URETERS:Kidneys enhance symmetrically. Subcentimeter hypodensities too small characterize. Fullness of the right collecting system without hydroureter.    BLADDER: Bowen catheter.  REPRODUCTIVE ORGANS: Prostate is normal in size.    BOWEL: Partially visualized enteric tube terminates in the distal stomach. No bowel obstruction. Appendix is not visualized. No evidence of inflammation in the pericecal region.  PERITONEUM: Trace ascites. No intra-abdominal free air.  VESSELS: Within normal limits.  RETROPERITONEUM/LYMPH NODES: No lymphadenopathy.    ABDOMINAL WALL: Small fat-containing left inguinal hernia.  BONES: Within normal limits.    IMPRESSION:   No sequela of acute trauma in the abdomen or pelvis.    Cluster of nodules in the posterior right lower lobe, possibly aspiration.        < end of copied text >  CXR:

## 2020-06-18 NOTE — PROCEDURE NOTE - NSICDXPROCEDURE_GEN_ALL_CORE_FT
PROCEDURES:  Insertion of central venous catheter with ultrasound guidance 18-Jun-2020 07:52:11  Amelia Haji

## 2025-02-26 NOTE — DISCHARGE NOTE FOR THE EXPIRED PATIENT - HOSPITAL COURSE
39yo Male unknown name/medical history BIBEMS for altered mental status/found on ground. Pt. was somnolent and unable to aid in history - as per EMS - found on ground unresponsive - no witnessess prior to being found on ground - unresponsive to verbal or physical stimuli, multiple beer cans near by smelling of alcohol. initial o2 sat 90% room air, patient maintaining his breathing/airway. Found with old hospital bracelet on with name "unknown male" - wearing hospital socks and very unkempt. Pt. was snoring, unable to elicit response via speaking or sternal rub. No prior medical information available at this time. CT head showed left 2.6 cm acute on chronic holohemispheric subdural hemorrhage with 2.5   cm rightward shift, trapped right lateral ventricle, with enlarged temporal horn, effacement of quadrigeminal plate and ambient cisterns and rightward displacement of the midbrain. Patient was intubated and taken to the OR where he underwent left hemicraniectomy and subdural hematoma evacuation ( 2U PRBC and 1 U platelets given intraoperatively). SICU consulted POD 0 for q1 neuro checks, strict BP monitoring, and mechanical ventilation for airway protection and oxygenation. On  CT Head performed and impression read as status post extensive left sided craniectomy and evacuation of subdural hematoma with a drain in place. Decreased mass effect and rightward midline shift. Evolving acute left PCA distribution infarct and questionable brainstem infarct. Low-density areas within the frontal lobes anteriorly which may represent evolving acute contusions or infarcts. The patient was exhibiting decerebrate posturing and throughout the day became tachycardic and increasingly hypotensive with consistently increasing pressor requirements and worsening acidosis requiring bicarbonate drip. Despite multiple pressor medications he eventually  at 19:46 on 20. none